# Patient Record
Sex: FEMALE | Race: WHITE | NOT HISPANIC OR LATINO | Employment: FULL TIME | URBAN - METROPOLITAN AREA
[De-identification: names, ages, dates, MRNs, and addresses within clinical notes are randomized per-mention and may not be internally consistent; named-entity substitution may affect disease eponyms.]

---

## 2017-06-23 DIAGNOSIS — Z00.00 ROUTINE GENERAL MEDICAL EXAMINATION AT A HEALTH CARE FACILITY: ICD-10-CM

## 2017-06-23 RX ORDER — CITALOPRAM 20 MG/1
TABLET ORAL
Qty: 30 TABLET | Refills: 9 | OUTPATIENT
Start: 2017-06-23

## 2017-07-05 ENCOUNTER — APPOINTMENT (OUTPATIENT)
Dept: LAB | Facility: HOSPITAL | Age: 42
End: 2017-07-05

## 2017-07-05 ENCOUNTER — OFFICE VISIT (OUTPATIENT)
Dept: FAMILY MEDICINE CLINIC | Facility: CLINIC | Age: 42
End: 2017-07-05

## 2017-07-05 VITALS
HEIGHT: 64 IN | BODY MASS INDEX: 26.29 KG/M2 | DIASTOLIC BLOOD PRESSURE: 72 MMHG | SYSTOLIC BLOOD PRESSURE: 108 MMHG | TEMPERATURE: 97.9 F | HEART RATE: 99 BPM | OXYGEN SATURATION: 100 % | RESPIRATION RATE: 14 BRPM | WEIGHT: 154 LBS

## 2017-07-05 DIAGNOSIS — Z00.00 ROUTINE GENERAL MEDICAL EXAMINATION AT A HEALTH CARE FACILITY: ICD-10-CM

## 2017-07-05 DIAGNOSIS — Z00.00 ROUTINE GENERAL MEDICAL EXAMINATION AT A HEALTH CARE FACILITY: Primary | ICD-10-CM

## 2017-07-05 LAB
ALBUMIN SERPL-MCNC: 4.5 G/DL (ref 3.2–4.8)
ALBUMIN/GLOB SERPL: 1.9 G/DL (ref 1.5–2.5)
ALP SERPL-CCNC: 50 U/L (ref 25–100)
ALT SERPL W P-5'-P-CCNC: 11 U/L (ref 7–40)
ANION GAP SERPL CALCULATED.3IONS-SCNC: 15 MMOL/L (ref 3–11)
ARTICHOKE IGE QN: 87 MG/DL (ref 0–130)
AST SERPL-CCNC: 18 U/L (ref 0–33)
BASOPHILS # BLD AUTO: 0.01 10*3/MM3 (ref 0–0.2)
BASOPHILS NFR BLD AUTO: 0.1 % (ref 0–1)
BILIRUB BLD-MCNC: NEGATIVE MG/DL
BILIRUB SERPL-MCNC: 0.7 MG/DL (ref 0.3–1.2)
BUN BLD-MCNC: 15 MG/DL (ref 9–23)
BUN/CREAT SERPL: 18.8 (ref 7–25)
CALCIUM SPEC-SCNC: 10 MG/DL (ref 8.7–10.4)
CHLORIDE SERPL-SCNC: 98 MMOL/L (ref 99–109)
CHOLEST SERPL-MCNC: 207 MG/DL (ref 0–200)
CLARITY, POC: CLEAR
CO2 SERPL-SCNC: 30 MMOL/L (ref 20–31)
COLOR UR: YELLOW
CREAT BLD-MCNC: 0.8 MG/DL (ref 0.6–1.3)
DEPRECATED RDW RBC AUTO: 44.7 FL (ref 37–54)
EOSINOPHIL # BLD AUTO: 0.2 10*3/MM3 (ref 0–0.3)
EOSINOPHIL NFR BLD AUTO: 2.8 % (ref 0–3)
ERYTHROCYTE [DISTWIDTH] IN BLOOD BY AUTOMATED COUNT: 13.2 % (ref 11.3–14.5)
GFR SERPL CREATININE-BSD FRML MDRD: 80 ML/MIN/1.73
GLOBULIN UR ELPH-MCNC: 2.4 GM/DL
GLUCOSE BLD-MCNC: 83 MG/DL (ref 70–100)
GLUCOSE UR STRIP-MCNC: NEGATIVE MG/DL
HCT VFR BLD AUTO: 42.1 % (ref 34.5–44)
HDLC SERPL-MCNC: 107 MG/DL (ref 40–60)
HGB BLD-MCNC: 13.7 G/DL (ref 11.5–15.5)
IMM GRANULOCYTES # BLD: 0.01 10*3/MM3 (ref 0–0.03)
IMM GRANULOCYTES NFR BLD: 0.1 % (ref 0–0.6)
KETONES UR QL: NEGATIVE
LEUKOCYTE EST, POC: NEGATIVE
LYMPHOCYTES # BLD AUTO: 1.39 10*3/MM3 (ref 0.6–4.8)
LYMPHOCYTES NFR BLD AUTO: 19.3 % (ref 24–44)
MCH RBC QN AUTO: 29.9 PG (ref 27–31)
MCHC RBC AUTO-ENTMCNC: 32.5 G/DL (ref 32–36)
MCV RBC AUTO: 91.9 FL (ref 80–99)
MONOCYTES # BLD AUTO: 0.55 10*3/MM3 (ref 0–1)
MONOCYTES NFR BLD AUTO: 7.6 % (ref 0–12)
NEUTROPHILS # BLD AUTO: 5.05 10*3/MM3 (ref 1.5–8.3)
NEUTROPHILS NFR BLD AUTO: 70.1 % (ref 41–71)
NITRITE UR-MCNC: NEGATIVE MG/ML
PH UR: 7 [PH] (ref 5–8)
PLATELET # BLD AUTO: 311 10*3/MM3 (ref 150–450)
PMV BLD AUTO: 10.5 FL (ref 6–12)
POTASSIUM BLD-SCNC: 4.6 MMOL/L (ref 3.5–5.5)
PROT SERPL-MCNC: 6.9 G/DL (ref 5.7–8.2)
PROT UR STRIP-MCNC: NEGATIVE MG/DL
RBC # BLD AUTO: 4.58 10*6/MM3 (ref 3.89–5.14)
RBC # UR STRIP: ABNORMAL /UL
SODIUM BLD-SCNC: 143 MMOL/L (ref 132–146)
SP GR UR: 1 (ref 1–1.03)
TRIGL SERPL-MCNC: 37 MG/DL (ref 0–150)
TSH SERPL DL<=0.05 MIU/L-ACNC: 3.12 MIU/ML (ref 0.35–5.35)
UROBILINOGEN UR QL: NORMAL
WBC NRBC COR # BLD: 7.21 10*3/MM3 (ref 3.5–10.8)

## 2017-07-05 PROCEDURE — 36415 COLL VENOUS BLD VENIPUNCTURE: CPT | Performed by: PHYSICIAN ASSISTANT

## 2017-07-05 PROCEDURE — 99395 PREV VISIT EST AGE 18-39: CPT | Performed by: PHYSICIAN ASSISTANT

## 2017-07-05 PROCEDURE — 80061 LIPID PANEL: CPT | Performed by: PHYSICIAN ASSISTANT

## 2017-07-05 PROCEDURE — 85025 COMPLETE CBC W/AUTO DIFF WBC: CPT | Performed by: PHYSICIAN ASSISTANT

## 2017-07-05 PROCEDURE — 81003 URINALYSIS AUTO W/O SCOPE: CPT | Performed by: PHYSICIAN ASSISTANT

## 2017-07-05 PROCEDURE — 80053 COMPREHEN METABOLIC PANEL: CPT | Performed by: PHYSICIAN ASSISTANT

## 2017-07-05 PROCEDURE — 84443 ASSAY THYROID STIM HORMONE: CPT | Performed by: PHYSICIAN ASSISTANT

## 2017-07-05 PROCEDURE — 93000 ELECTROCARDIOGRAM COMPLETE: CPT | Performed by: PHYSICIAN ASSISTANT

## 2017-07-05 RX ORDER — CITALOPRAM 20 MG/1
20 TABLET ORAL DAILY
Qty: 30 TABLET | Refills: 11 | Status: SHIPPED | OUTPATIENT
Start: 2017-07-05 | End: 2017-11-02 | Stop reason: SDUPTHER

## 2017-07-05 NOTE — PROGRESS NOTES
Subjective   Valery Soriano is a 41 y.o. female    History of Present Illness  He's a pleasant 41-year-old white female comes in for full preventive medical examination, denies any problems or complaints doing well no shortness breath no chest pain      The following portions of the patient's history were reviewed and updated as appropriate: allergies, current medications, past social history and problem list    Review of Systems   Constitutional: Negative.    HENT: Negative.    Eyes: Negative.    Respiratory: Negative.    Cardiovascular: Negative.    Gastrointestinal: Negative.    Endocrine: Negative.    Genitourinary: Negative.    Musculoskeletal: Negative.    Skin: Negative.    Allergic/Immunologic: Negative.    Neurological: Negative.    Hematological: Negative.    Psychiatric/Behavioral: Negative.    All other systems reviewed and are negative.      Objective     Vitals:    07/05/17 0900   BP: 108/72   Pulse: 99   Resp: 14   Temp: 97.9 °F (36.6 °C)   SpO2: 100%       Physical Exam   Constitutional: She is oriented to person, place, and time. She appears well-developed and well-nourished.   HENT:   Head: Normocephalic and atraumatic.   Right Ear: External ear normal.   Left Ear: External ear normal.   Nose: Nose normal.   Mouth/Throat: Oropharynx is clear and moist.   Eyes: Conjunctivae and EOM are normal. Pupils are equal, round, and reactive to light.   Neck: Normal range of motion. Neck supple. No JVD present. Carotid bruit is not present. No thyromegaly present.   Cardiovascular: Normal rate, regular rhythm, normal heart sounds and intact distal pulses.    No murmur heard.  Pulmonary/Chest: Effort normal and breath sounds normal.   Abdominal: Soft. Bowel sounds are normal. She exhibits no mass. There is no tenderness.   Musculoskeletal: Normal range of motion. She exhibits no edema.   Lymphadenopathy:     She has no cervical adenopathy.   Neurological: She is alert and oriented to person, place, and time. She  has normal reflexes. No cranial nerve deficit.   Skin: Skin is warm and dry.   Psychiatric: She has a normal mood and affect.   Nursing note and vitals reviewed.    ECG 12 Lead  Date/Time: 7/5/2017 9:26 AM  Performed by: JUSTICE DE LA GARZA  Authorized by: JUSTICE DE LA GARZA   Comparison: not compared with previous ECG   Rhythm: sinus rhythm  Rate: normal  ST Segments: ST segments normal  T Waves: T waves normal  QRS axis: normal  Clinical impression: normal ECG  Comments: nsr            Assessment/Plan     Diagnoses and all orders for this visit:    Routine general medical examination at a health care facility  -     POCT urinalysis dipstick, automated  -     ECG 12 Lead  -     Lipid Panel  -     TSH  -     CBC & Differential  -     Comprehensive Metabolic Panel    Preventive medicine discussed at length, healthy diet, exercise, healthy living TIPS discussed.  Diet low carb low calorie diet along with exercise 3-5 times per week for 30 minutes discussed at length.

## 2017-10-22 ENCOUNTER — OFFICE VISIT (OUTPATIENT)
Dept: RETAIL CLINIC | Facility: CLINIC | Age: 42
End: 2017-10-22

## 2017-10-22 DIAGNOSIS — Z23 FLU VACCINE NEED: Primary | ICD-10-CM

## 2017-10-22 NOTE — PROGRESS NOTES
Patient presents to clinic for seasonal influenza vaccination.  Denies allergies to eggs, latex, mercury or other flu vaccine components.  Denies previous vaccine reaction, current illness or fever.      Consent discussed and signed. VIS given. Vaccination administered. Patient tolerated well. See scanned documents.

## 2017-10-27 ENCOUNTER — TRANSCRIBE ORDERS (OUTPATIENT)
Dept: FAMILY MEDICINE CLINIC | Facility: CLINIC | Age: 42
End: 2017-10-27

## 2017-10-27 DIAGNOSIS — Z12.31 VISIT FOR SCREENING MAMMOGRAM: Primary | ICD-10-CM

## 2017-11-02 DIAGNOSIS — Z00.00 ROUTINE GENERAL MEDICAL EXAMINATION AT A HEALTH CARE FACILITY: ICD-10-CM

## 2017-11-02 RX ORDER — CITALOPRAM 20 MG/1
20 TABLET ORAL DAILY
Qty: 90 TABLET | Refills: 2 | Status: SHIPPED | OUTPATIENT
Start: 2017-11-02 | End: 2018-07-13 | Stop reason: SDUPTHER

## 2017-11-27 ENCOUNTER — APPOINTMENT (OUTPATIENT)
Dept: MAMMOGRAPHY | Facility: HOSPITAL | Age: 42
End: 2017-11-27

## 2017-12-01 ENCOUNTER — APPOINTMENT (OUTPATIENT)
Dept: MAMMOGRAPHY | Facility: HOSPITAL | Age: 42
End: 2017-12-01

## 2017-12-05 ENCOUNTER — HOSPITAL ENCOUNTER (OUTPATIENT)
Dept: MAMMOGRAPHY | Facility: HOSPITAL | Age: 42
Discharge: HOME | End: 2017-12-05
Admitting: PHYSICIAN ASSISTANT

## 2017-12-05 ENCOUNTER — TRANSCRIBE ORDERS (OUTPATIENT)
Dept: FAMILY MEDICINE CLINIC | Facility: CLINIC | Age: 42
End: 2017-12-05

## 2017-12-05 DIAGNOSIS — Z12.39 SCREENING BREAST EXAMINATION: ICD-10-CM

## 2017-12-05 DIAGNOSIS — Z12.39 SCREENING BREAST EXAMINATION: Primary | ICD-10-CM

## 2017-12-05 PROCEDURE — 77063 BREAST TOMOSYNTHESIS BI: CPT

## 2017-12-05 PROCEDURE — 77063 BREAST TOMOSYNTHESIS BI: CPT | Performed by: RADIOLOGY

## 2017-12-05 PROCEDURE — 77067 SCR MAMMO BI INCL CAD: CPT | Performed by: RADIOLOGY

## 2017-12-05 PROCEDURE — G0202 SCR MAMMO BI INCL CAD: HCPCS

## 2017-12-19 ENCOUNTER — OFFICE VISIT (OUTPATIENT)
Dept: FAMILY MEDICINE CLINIC | Facility: CLINIC | Age: 42
End: 2017-12-19

## 2017-12-19 VITALS
BODY MASS INDEX: 28.75 KG/M2 | SYSTOLIC BLOOD PRESSURE: 104 MMHG | OXYGEN SATURATION: 98 % | HEIGHT: 64 IN | DIASTOLIC BLOOD PRESSURE: 76 MMHG | WEIGHT: 168.4 LBS | HEART RATE: 92 BPM | RESPIRATION RATE: 14 BRPM

## 2017-12-19 DIAGNOSIS — IMO0001 CLASS 3 OBESITY DUE TO EXCESS CALORIES WITHOUT SERIOUS COMORBIDITY WITH BODY MASS INDEX (BMI) OF 60.0 TO 69.9 IN ADULT: Primary | ICD-10-CM

## 2017-12-19 PROCEDURE — 99213 OFFICE O/P EST LOW 20 MIN: CPT | Performed by: PHYSICIAN ASSISTANT

## 2017-12-19 NOTE — PROGRESS NOTES
Subjective   Valery Soriano is a 42 y.o. female    History of Present Illness  Patient 42-year-old white female comes in complaining of weight gain obesity she's having trouble losing weight she been trying to follow a low-fat low calorie diet without much success.  She has feels tired has been doing a low carb low calorie diet.      The following portions of the patient's history were reviewed and updated as appropriate: allergies, current medications, past social history and problem list    Review of Systems   Constitutional: Negative for fatigue and unexpected weight change.   Respiratory: Negative for cough, chest tightness and shortness of breath.    Cardiovascular: Negative for chest pain, palpitations and leg swelling.   Gastrointestinal: Negative for nausea.   Skin: Negative for color change and rash.   Neurological: Negative for dizziness, syncope, weakness and headaches.       Objective     Vitals:    12/19/17 1029   BP: 104/76   Pulse: 92   Resp: 14   SpO2: 98%       Physical Exam   Constitutional: She appears well-developed and well-nourished.   Neck: No JVD present.   Cardiovascular: Normal rate, regular rhythm, normal heart sounds, intact distal pulses and normal pulses.    No murmur heard.  Pulmonary/Chest: Effort normal and breath sounds normal. No respiratory distress.   Abdominal: Soft. Bowel sounds are normal. There is no hepatosplenomegaly. There is no tenderness.   Musculoskeletal: She exhibits no edema.   Skin: Skin is warm and dry.   Nursing note and vitals reviewed.      Assessment/Plan     Diagnoses and all orders for this visit:    Class 3 obesity due to excess calories without serious comorbidity with body mass index (BMI) of 60.0 to 69.9 in adult      #1 phentermine 37.5 mg 1 by mouth everyday dispense 30×3 refills    Low-fat low calorie diet,Discussed ways lose weight such as exercise 3-5 times per week for 30 minutes

## 2018-01-04 ENCOUNTER — OFFICE VISIT (OUTPATIENT)
Dept: RETAIL CLINIC | Facility: CLINIC | Age: 43
End: 2018-01-04

## 2018-01-04 VITALS
RESPIRATION RATE: 16 BRPM | OXYGEN SATURATION: 99 % | HEIGHT: 65 IN | WEIGHT: 164.8 LBS | BODY MASS INDEX: 27.46 KG/M2 | HEART RATE: 89 BPM | TEMPERATURE: 98.5 F

## 2018-01-04 DIAGNOSIS — B02.9 HERPES ZOSTER WITHOUT COMPLICATION: Primary | ICD-10-CM

## 2018-01-04 PROCEDURE — 99213 OFFICE O/P EST LOW 20 MIN: CPT | Performed by: NURSE PRACTITIONER

## 2018-01-04 RX ORDER — VALACYCLOVIR HYDROCHLORIDE 1 G/1
1000 TABLET, FILM COATED ORAL 3 TIMES DAILY
Qty: 21 TABLET | Refills: 0 | Status: SHIPPED | OUTPATIENT
Start: 2018-01-04 | End: 2018-01-11

## 2018-01-04 NOTE — PROGRESS NOTES
"Subjective   Valery Soriano is a 42 y.o. female.     Rash   This is a new problem. Episode onset: 4 days, began with scalp tingling, then pain on touch and then rash. The problem has been rapidly worsening since onset. Location: left face and scalp. The rash is characterized by blistering, burning, pain and redness. Associated with: chicken pox in childhood. Pertinent negatives include no anorexia, eye pain, fatigue, fever or shortness of breath. Past treatments include anti-itch cream. The treatment provided no relief.        The following portions of the patient's history were reviewed and updated as appropriate: allergies, current medications, past medical history, past social history, past surgical history and problem list.    Review of Systems   Constitutional: Negative.  Negative for fatigue and fever.   Eyes: Negative.  Negative for photophobia, pain, discharge, redness and itching.   Respiratory: Negative.  Negative for shortness of breath.    Cardiovascular: Negative.    Gastrointestinal: Negative.  Negative for anorexia.   Skin: Positive for rash (painful, blistering, red rash on left face, worsening, spreading, unresponsive to otc medications).        Pulse 89  Temp 98.5 °F (36.9 °C) (Temporal Artery )   Resp 16  Ht 165.1 cm (65\")  Wt 74.8 kg (164 lb 12.8 oz)  SpO2 99%  BMI 27.42 kg/m2     Objective   Physical Exam   Constitutional: She is oriented to person, place, and time. She appears well-developed and well-nourished. No distress.   Eyes: Conjunctivae and EOM are normal. Pupils are equal, round, and reactive to light. Right eye exhibits no discharge. Left eye exhibits no discharge.   Lymphadenopathy:        Head (right side): No tonsillar, no preauricular, no posterior auricular and no occipital adenopathy present.        Head (left side): No tonsillar, no preauricular, no posterior auricular and no occipital adenopathy present.     She has no cervical adenopathy.   Neurological: She is alert and " oriented to person, place, and time.   Skin: Skin is warm and dry. Rash (vesicular, encursted red rash on left forhead, upper left cheek and left scalp, painful on palpation, no secondary infection noted, rash approaches, but does not cross the midline of the face ) noted.        Psychiatric: She has a normal mood and affect. Her behavior is normal. Thought content normal.   Vitals reviewed.      Assessment/Plan   Valery was seen today for rash.    Diagnoses and all orders for this visit:    Herpes zoster without complication  -     valACYclovir (VALTREX) 1000 MG tablet; Take 1 tablet by mouth 3 (Three) Times a Day for 7 days.

## 2018-01-04 NOTE — PATIENT INSTRUCTIONS
Shingles  Shingles, which is also known as herpes zoster, is an infection that causes a painful skin rash and fluid-filled blisters. Shingles is not related to genital herpes, which is a sexually transmitted infection.   Shingles only develops in people who:  · Have had chickenpox.  · Have received the chickenpox vaccine. (This is rare.)  CAUSES  Shingles is caused by varicella-zoster virus (VZV). This is the same virus that causes chickenpox. After exposure to VZV, the virus stays in the body in an inactive (dormant) state. Shingles develops if the virus reactivates. This can happen many years after the initial exposure to VZV. It is not known what causes this virus to reactivate.  RISK FACTORS  People who have had chickenpox or received the chickenpox vaccine are at risk for shingles. Infection is more common in people who:  · Are older than age 50.  · Have a weakened defense (immune) system, such as those with HIV, AIDS, or cancer.  · Are taking medicines that weaken the immune system, such as transplant medicines.  · Are under great stress.  SYMPTOMS  Early symptoms of this condition include itching, tingling, and pain in an area on your skin. Pain may be described as burning, stabbing, or throbbing.  A few days or weeks after symptoms start, a painful red rash appears, usually on one side of the body in a bandlike or beltlike pattern. The rash eventually turns into fluid-filled blisters that break open, scab over, and dry up in about 2-3 weeks.  At any time during the infection, you may also develop:  · A fever.  · Chills.  · A headache.  · An upset stomach.  DIAGNOSIS  This condition is diagnosed with a skin exam. Sometimes, skin or fluid samples are taken from the blisters before a diagnosis is made. These samples are examined under a microscope or sent to a lab for testing.  TREATMENT  There is no specific cure for this condition. Your health care provider will probably prescribe medicines to help you manage  pain, recover more quickly, and avoid long-term problems. Medicines may include:  · Antiviral drugs.  · Anti-inflammatory drugs.  · Pain medicines.  If the area involved is on your face, you may be referred to a specialist, such as an eye doctor (ophthalmologist) or an ear, nose, and throat (ENT) doctor to help you avoid eye problems, chronic pain, or disability.  HOME CARE INSTRUCTIONS  Medicines  · Take medicines only as directed by your health care provider.  · Apply an anti-itch or numbing cream to the affected area as directed by your health care provider.  Blister and Rash Care  · Take a cool bath or apply cool compresses to the area of the rash or blisters as directed by your health care provider. This may help with pain and itching.  · Keep your rash covered with a loose bandage (dressing). Wear loose-fitting clothing to help ease the pain of material rubbing against the rash.  · Keep your rash and blisters clean with mild soap and cool water or as directed by your health care provider.  · Check your rash every day for signs of infection. These include redness, swelling, and pain that lasts or increases.  · Do not pick your blisters.  · Do not scratch your rash.  General Instructions  · Rest as directed by your health care provider.  · Keep all follow-up visits as directed by your health care provider. This is important.  · Until your blisters scab over, your infection can cause chickenpox in people who have never had it or been vaccinated against it. To prevent this from happening, avoid contact with other people, especially:    Babies.    Pregnant women.    Children who have eczema.    Elderly people who have transplants.    People who have chronic illnesses, such as leukemia or AIDS.  SEEK MEDICAL CARE IF:  · Your pain is not relieved with prescribed medicines.  · Your pain does not get better after the rash heals.  · Your rash looks infected. Signs of infection include redness, swelling, and pain that  lasts or increases.  SEEK IMMEDIATE MEDICAL CARE IF:  · The rash is on your face or nose.  · You have facial pain, pain around your eye area, or loss of feeling on one side of your face.  · You have ear pain or you have ringing in your ear.  · You have loss of taste.  · Your condition gets worse.     This information is not intended to replace advice given to you by your health care provider. Make sure you discuss any questions you have with your health care provider.     Document Released: 12/18/2006 Document Revised: 04/10/2017 Document Reviewed: 10/29/2015  [employer] Interactive Patient Education ©2017 [employer]

## 2018-01-17 ENCOUNTER — HOSPITAL ENCOUNTER (OUTPATIENT)
Dept: MAMMOGRAPHY | Facility: HOSPITAL | Age: 43
End: 2018-01-17

## 2018-01-22 ENCOUNTER — OFFICE VISIT (OUTPATIENT)
Dept: FAMILY MEDICINE CLINIC | Facility: CLINIC | Age: 43
End: 2018-01-22

## 2018-01-22 VITALS
TEMPERATURE: 98.6 F | OXYGEN SATURATION: 98 % | HEART RATE: 100 BPM | HEIGHT: 65 IN | WEIGHT: 162.2 LBS | SYSTOLIC BLOOD PRESSURE: 110 MMHG | RESPIRATION RATE: 14 BRPM | DIASTOLIC BLOOD PRESSURE: 74 MMHG | BODY MASS INDEX: 27.02 KG/M2

## 2018-01-22 DIAGNOSIS — R63.5 WEIGHT GAIN: Primary | ICD-10-CM

## 2018-01-22 PROCEDURE — 99212 OFFICE O/P EST SF 10 MIN: CPT | Performed by: PHYSICIAN ASSISTANT

## 2018-01-22 RX ORDER — PHENTERMINE HYDROCHLORIDE 37.5 MG/1
1 TABLET ORAL DAILY
Refills: 3 | COMMUNITY
Start: 2018-01-17 | End: 2018-07-20 | Stop reason: SDUPTHER

## 2018-01-22 NOTE — PROGRESS NOTES
Subjective   Valery Soriano is a 42 y.o. female    History of Present Illness  Patient is a pleasant 42-year-old white female comes in follow-up of weight gain she states she's lost 4 pounds since her last visit she's taking phentermine daily exercising doing well following a low-calorie low-carb diet.      The following portions of the patient's history were reviewed and updated as appropriate: allergies, current medications, past social history and problem list    Review of Systems   Constitutional: Negative for fatigue and unexpected weight change.   Respiratory: Negative for cough, chest tightness and shortness of breath.    Cardiovascular: Negative for chest pain, palpitations and leg swelling.   Gastrointestinal: Negative for nausea.   Skin: Negative for color change and rash.   Neurological: Negative for dizziness, syncope, weakness and headaches.       Objective     Vitals:    01/22/18 1508   BP: 110/74   Pulse: 100   Resp: 14   Temp: 98.6 °F (37 °C)   SpO2: 98%       Physical Exam   Constitutional: She appears well-developed and well-nourished.   Neck: Neck supple. No JVD present. No thyromegaly present.   Cardiovascular: Normal rate, regular rhythm, normal heart sounds, intact distal pulses and normal pulses.    No murmur heard.  Pulmonary/Chest: Effort normal and breath sounds normal. No respiratory distress.   Abdominal: Soft. Bowel sounds are normal. There is no hepatosplenomegaly. There is no tenderness.   Musculoskeletal: She exhibits no edema.   Lymphadenopathy:     She has no cervical adenopathy.   Neurological: No sensory deficit.   Skin: Skin is warm and dry. She is not diaphoretic.   Nursing note and vitals reviewed.      Assessment/Plan     Diagnoses and all orders for this visit:    Weight gain  -     phentermine (ADIPEX-P) 37.5 MG tablet; Take 1 tablet by mouth Daily.    Low-calorie low-carb diet, patient was told to come to 6 pounds in next month is 2 pounds per week

## 2018-07-11 DIAGNOSIS — R63.5 WEIGHT GAIN: ICD-10-CM

## 2018-07-11 RX ORDER — PHENTERMINE HYDROCHLORIDE 37.5 MG/1
TABLET ORAL
Qty: 30 TABLET | Status: CANCELLED | OUTPATIENT
Start: 2018-07-11

## 2018-07-13 DIAGNOSIS — Z00.00 ROUTINE GENERAL MEDICAL EXAMINATION AT A HEALTH CARE FACILITY: ICD-10-CM

## 2018-07-16 RX ORDER — CITALOPRAM 20 MG/1
20 TABLET ORAL DAILY
Qty: 90 TABLET | Refills: 2 | Status: SHIPPED | OUTPATIENT
Start: 2018-07-16 | End: 2019-04-14 | Stop reason: SDUPTHER

## 2018-07-20 DIAGNOSIS — R63.5 WEIGHT GAIN: ICD-10-CM

## 2018-07-20 RX ORDER — PHENTERMINE HYDROCHLORIDE 37.5 MG/1
37.5 TABLET ORAL DAILY
Qty: 30 TABLET | Refills: 1 | OUTPATIENT
Start: 2018-07-20 | End: 2018-10-17

## 2018-07-20 NOTE — TELEPHONE ENCOUNTER
----- Message from Kiki Lopes sent at 7/20/2018 10:49 AM EDT -----  Contact: [facility]   REFILL REQUEST   phentermine (ADIPEX-P) 37.5 MG tablet    [facility]/pharmacy #[id number] - [place], [place] - [facility] [address] AT NEXT TO [facility] - [phone]  - [phone] (Phone)  [phone] (Fax)

## 2018-10-11 ENCOUNTER — OFFICE VISIT (OUTPATIENT)
Dept: RETAIL CLINIC | Facility: CLINIC | Age: 43
End: 2018-10-11

## 2018-10-11 DIAGNOSIS — Z23 NEED FOR VACCINATION: Primary | ICD-10-CM

## 2018-10-17 ENCOUNTER — OFFICE VISIT (OUTPATIENT)
Dept: FAMILY MEDICINE CLINIC | Facility: CLINIC | Age: 43
End: 2018-10-17

## 2018-10-17 VITALS
HEIGHT: 65 IN | OXYGEN SATURATION: 98 % | TEMPERATURE: 98.2 F | DIASTOLIC BLOOD PRESSURE: 80 MMHG | HEART RATE: 87 BPM | SYSTOLIC BLOOD PRESSURE: 121 MMHG | RESPIRATION RATE: 14 BRPM | WEIGHT: 170.6 LBS | BODY MASS INDEX: 28.42 KG/M2

## 2018-10-17 DIAGNOSIS — Z00.00 ROUTINE GENERAL MEDICAL EXAMINATION AT A HEALTH CARE FACILITY: Primary | ICD-10-CM

## 2018-10-17 LAB
BILIRUB BLD-MCNC: NEGATIVE MG/DL
CLARITY, POC: CLEAR
COLOR UR: YELLOW
GLUCOSE UR STRIP-MCNC: NEGATIVE MG/DL
KETONES UR QL: NEGATIVE
LEUKOCYTE EST, POC: NEGATIVE
NITRITE UR-MCNC: NEGATIVE MG/ML
PH UR: 5.5 [PH] (ref 5–8)
PROT UR STRIP-MCNC: NEGATIVE MG/DL
RBC # UR STRIP: ABNORMAL /UL
SP GR UR: 1.02 (ref 1–1.03)
UROBILINOGEN UR QL: NORMAL

## 2018-10-17 PROCEDURE — 99396 PREV VISIT EST AGE 40-64: CPT | Performed by: PHYSICIAN ASSISTANT

## 2018-10-17 PROCEDURE — 93000 ELECTROCARDIOGRAM COMPLETE: CPT | Performed by: PHYSICIAN ASSISTANT

## 2018-10-17 PROCEDURE — 81003 URINALYSIS AUTO W/O SCOPE: CPT | Performed by: PHYSICIAN ASSISTANT

## 2018-10-17 NOTE — PROGRESS NOTES
Subjective   Valery Soriano is a 43 y.o. female  Annual Exam (Not fasting. UTD on MMG, Pap. Had flu/HepA vaccines.)      History of Present Illness  Patient is a 43-year-old white female comes in for preventive medical examination no new problems or complaints      The following portions of the patient's history were reviewed and updated as appropriate: allergies, current medications, past social history and problem list    Review of Systems   Constitutional: Negative.    HENT: Negative.    Eyes: Negative.    Respiratory: Negative.    Cardiovascular: Negative.    Gastrointestinal: Negative.    Endocrine: Negative.    Genitourinary: Negative.    Musculoskeletal: Negative.    Skin: Negative.    Allergic/Immunologic: Negative.    Neurological: Negative.    Hematological: Negative.    Psychiatric/Behavioral: Negative.    All other systems reviewed and are negative.      Objective     Vitals:    10/17/18 1340   BP: 121/80   Pulse: 87   Resp: 14   Temp: 98.2 °F (36.8 °C)   SpO2: 98%       Physical Exam   Constitutional: She is oriented to person, place, and time. She appears well-developed and well-nourished.   HENT:   Head: Normocephalic and atraumatic.   Right Ear: External ear normal.   Left Ear: External ear normal.   Nose: Nose normal.   Mouth/Throat: Oropharynx is clear and moist.   Eyes: Pupils are equal, round, and reactive to light. Conjunctivae and EOM are normal.   Neck: Normal range of motion. Neck supple. No JVD present. Carotid bruit is not present. No thyromegaly present.   Cardiovascular: Normal rate, regular rhythm, normal heart sounds and intact distal pulses.    No murmur heard.  Pulmonary/Chest: Effort normal and breath sounds normal.   Abdominal: Soft. Bowel sounds are normal. She exhibits no mass. There is no tenderness.   Musculoskeletal: Normal range of motion. She exhibits no edema.   Lymphadenopathy:     She has no cervical adenopathy.   Neurological: She is alert and oriented to person, place, and  time. She has normal reflexes. No cranial nerve deficit.   Skin: Skin is warm and dry.   Psychiatric: She has a normal mood and affect.   Nursing note and vitals reviewed.    ECG 12 Lead  Date/Time: 10/17/2018 4:35 PM  Performed by: JUSTICE DE LA GARZA  Authorized by: JUSTICE DE LA GARZA   Rate: normal  Conduction: conduction normal  ST Segments: ST segments normal  T Waves: T waves normal  QRS axis: normal  Clinical impression: normal ECG            Assessment/Plan     Diagnoses and all orders for this visit:    Routine general medical examination at a health care facility  -     POCT urinalysis dipstick, automated  -     CBC & Differential; Future  -     Comprehensive Metabolic Panel; Future  -     Lipid Panel; Future  -     TSH; Future  -     Vitamin D 25 hydroxy; Future    Any medicine discussed, diet, exercise, healthy living discussed at length discussed importance of losing weight exercise

## 2018-10-22 ENCOUNTER — LAB (OUTPATIENT)
Dept: LAB | Facility: HOSPITAL | Age: 43
End: 2018-10-22

## 2018-10-22 DIAGNOSIS — Z00.00 ROUTINE GENERAL MEDICAL EXAMINATION AT A HEALTH CARE FACILITY: ICD-10-CM

## 2018-10-22 LAB
25(OH)D3 SERPL-MCNC: 18.8 NG/ML
ALBUMIN SERPL-MCNC: 4.76 G/DL (ref 3.2–4.8)
ALBUMIN/GLOB SERPL: 2.7 G/DL (ref 1.5–2.5)
ALP SERPL-CCNC: 50 U/L (ref 25–100)
ALT SERPL W P-5'-P-CCNC: 14 U/L (ref 7–40)
ANION GAP SERPL CALCULATED.3IONS-SCNC: 9 MMOL/L (ref 3–11)
ARTICHOKE IGE QN: 85 MG/DL (ref 0–130)
AST SERPL-CCNC: 17 U/L (ref 0–33)
BASOPHILS # BLD AUTO: 0.01 10*3/MM3 (ref 0–0.2)
BASOPHILS NFR BLD AUTO: 0.1 % (ref 0–1)
BILIRUB SERPL-MCNC: 0.8 MG/DL (ref 0.3–1.2)
BUN BLD-MCNC: 14 MG/DL (ref 9–23)
BUN/CREAT SERPL: 15.7 (ref 7–25)
CALCIUM SPEC-SCNC: 9.7 MG/DL (ref 8.7–10.4)
CHLORIDE SERPL-SCNC: 103 MMOL/L (ref 99–109)
CHOLEST SERPL-MCNC: 185 MG/DL (ref 0–200)
CO2 SERPL-SCNC: 28 MMOL/L (ref 20–31)
CREAT BLD-MCNC: 0.89 MG/DL (ref 0.6–1.3)
DEPRECATED RDW RBC AUTO: 43.2 FL (ref 37–54)
EOSINOPHIL # BLD AUTO: 0.3 10*3/MM3 (ref 0–0.3)
EOSINOPHIL NFR BLD AUTO: 4.2 % (ref 0–3)
ERYTHROCYTE [DISTWIDTH] IN BLOOD BY AUTOMATED COUNT: 13.3 % (ref 11.3–14.5)
GFR SERPL CREATININE-BSD FRML MDRD: 70 ML/MIN/1.73
GLOBULIN UR ELPH-MCNC: 1.7 GM/DL
GLUCOSE BLD-MCNC: 95 MG/DL (ref 70–100)
HCT VFR BLD AUTO: 45 % (ref 34.5–44)
HDLC SERPL-MCNC: 92 MG/DL (ref 40–60)
HGB BLD-MCNC: 14.6 G/DL (ref 11.5–15.5)
IMM GRANULOCYTES # BLD: 0.02 10*3/MM3 (ref 0–0.03)
IMM GRANULOCYTES NFR BLD: 0.3 % (ref 0–0.6)
LYMPHOCYTES # BLD AUTO: 1.49 10*3/MM3 (ref 0.6–4.8)
LYMPHOCYTES NFR BLD AUTO: 20.6 % (ref 24–44)
MCH RBC QN AUTO: 29.3 PG (ref 27–31)
MCHC RBC AUTO-ENTMCNC: 32.4 G/DL (ref 32–36)
MCV RBC AUTO: 90.4 FL (ref 80–99)
MONOCYTES # BLD AUTO: 0.52 10*3/MM3 (ref 0–1)
MONOCYTES NFR BLD AUTO: 7.2 % (ref 0–12)
NEUTROPHILS # BLD AUTO: 4.88 10*3/MM3 (ref 1.5–8.3)
NEUTROPHILS NFR BLD AUTO: 67.6 % (ref 41–71)
PLATELET # BLD AUTO: 331 10*3/MM3 (ref 150–450)
PMV BLD AUTO: 9.8 FL (ref 6–12)
POTASSIUM BLD-SCNC: 4.7 MMOL/L (ref 3.5–5.5)
PROT SERPL-MCNC: 6.5 G/DL (ref 5.7–8.2)
RBC # BLD AUTO: 4.98 10*6/MM3 (ref 3.89–5.14)
SODIUM BLD-SCNC: 140 MMOL/L (ref 132–146)
TRIGL SERPL-MCNC: 59 MG/DL (ref 0–150)
TSH SERPL DL<=0.05 MIU/L-ACNC: 2.42 MIU/ML (ref 0.35–5.35)
WBC NRBC COR # BLD: 7.22 10*3/MM3 (ref 3.5–10.8)

## 2018-10-22 PROCEDURE — 80053 COMPREHEN METABOLIC PANEL: CPT

## 2018-10-22 PROCEDURE — 82306 VITAMIN D 25 HYDROXY: CPT

## 2018-10-22 PROCEDURE — 80061 LIPID PANEL: CPT

## 2018-10-22 PROCEDURE — 84443 ASSAY THYROID STIM HORMONE: CPT

## 2018-10-22 PROCEDURE — 85025 COMPLETE CBC W/AUTO DIFF WBC: CPT

## 2018-10-22 PROCEDURE — 36415 COLL VENOUS BLD VENIPUNCTURE: CPT

## 2018-12-12 ENCOUNTER — TRANSCRIBE ORDERS (OUTPATIENT)
Dept: MAMMOGRAPHY | Facility: HOSPITAL | Age: 43
End: 2018-12-12

## 2018-12-12 ENCOUNTER — HOSPITAL ENCOUNTER (OUTPATIENT)
Dept: MAMMOGRAPHY | Facility: HOSPITAL | Age: 43
Discharge: HOME | End: 2018-12-12
Admitting: FAMILY MEDICINE

## 2018-12-12 DIAGNOSIS — Z12.31 VISIT FOR SCREENING MAMMOGRAM: ICD-10-CM

## 2018-12-12 DIAGNOSIS — Z12.31 VISIT FOR SCREENING MAMMOGRAM: Primary | ICD-10-CM

## 2018-12-12 PROCEDURE — 77063 BREAST TOMOSYNTHESIS BI: CPT | Performed by: RADIOLOGY

## 2018-12-12 PROCEDURE — 77067 SCR MAMMO BI INCL CAD: CPT

## 2018-12-12 PROCEDURE — 77063 BREAST TOMOSYNTHESIS BI: CPT

## 2018-12-12 PROCEDURE — 77067 SCR MAMMO BI INCL CAD: CPT | Performed by: RADIOLOGY

## 2019-03-27 NOTE — TELEPHONE ENCOUNTER
Regarding: Prescription Question  Contact: [phone]  ----- Message from [employer], Generic sent at 3/22/2019 12:06 PM EDT -----    I would like to see if I could get another RX for the diet medicine?

## 2019-03-28 ENCOUNTER — TELEPHONE (OUTPATIENT)
Dept: FAMILY MEDICINE CLINIC | Facility: CLINIC | Age: 44
End: 2019-03-28

## 2019-03-28 RX ORDER — PHENTERMINE HYDROCHLORIDE 37.5 MG/1
37.5 TABLET ORAL
Qty: 30 TABLET | Refills: 1 | OUTPATIENT
Start: 2019-03-28 | End: 2019-10-25

## 2019-03-28 NOTE — TELEPHONE ENCOUNTER
Tried to call patient but no answer. Message sent to her on [employer] to see where she wants med call in.

## 2019-03-28 NOTE — TELEPHONE ENCOUNTER
----- Message from Ursula Hercules sent at 3/28/2019  2:16 PM EDT -----  Contact[facility] [place]N, [phone]  PT. SEE'S DR. BUZZ ALVES STATED A MED. WAS CALLED IN FOR PHENTERMINE.  NEEDING PT'S. BMI.    CONTACT JOSELYN @ [facility].

## 2019-04-14 DIAGNOSIS — Z00.00 ROUTINE GENERAL MEDICAL EXAMINATION AT A HEALTH CARE FACILITY: ICD-10-CM

## 2019-04-15 RX ORDER — CITALOPRAM 20 MG/1
20 TABLET ORAL DAILY
Qty: 90 TABLET | Refills: 2 | Status: SHIPPED | OUTPATIENT
Start: 2019-04-15 | End: 2019-10-25 | Stop reason: SDUPTHER

## 2019-10-25 ENCOUNTER — OFFICE VISIT (OUTPATIENT)
Dept: FAMILY MEDICINE CLINIC | Facility: CLINIC | Age: 44
End: 2019-10-25

## 2019-10-25 VITALS
RESPIRATION RATE: 16 BRPM | SYSTOLIC BLOOD PRESSURE: 118 MMHG | HEART RATE: 95 BPM | OXYGEN SATURATION: 99 % | WEIGHT: 177 LBS | TEMPERATURE: 98.5 F | HEIGHT: 65 IN | DIASTOLIC BLOOD PRESSURE: 78 MMHG | BODY MASS INDEX: 29.49 KG/M2

## 2019-10-25 DIAGNOSIS — Z00.00 ROUTINE GENERAL MEDICAL EXAMINATION AT A HEALTH CARE FACILITY: ICD-10-CM

## 2019-10-25 LAB
BILIRUB BLD-MCNC: NEGATIVE MG/DL
CLARITY, POC: CLEAR
COLOR UR: YELLOW
GLUCOSE UR STRIP-MCNC: NEGATIVE MG/DL
KETONES UR QL: NEGATIVE
LEUKOCYTE EST, POC: NEGATIVE
NITRITE UR-MCNC: NEGATIVE MG/ML
PH UR: 6 [PH] (ref 5–8)
PROT UR STRIP-MCNC: NEGATIVE MG/DL
RBC # UR STRIP: ABNORMAL /UL
SP GR UR: 1.01 (ref 1–1.03)
UROBILINOGEN UR QL: NORMAL

## 2019-10-25 PROCEDURE — 99396 PREV VISIT EST AGE 40-64: CPT | Performed by: PHYSICIAN ASSISTANT

## 2019-10-25 PROCEDURE — 93000 ELECTROCARDIOGRAM COMPLETE: CPT | Performed by: PHYSICIAN ASSISTANT

## 2019-10-25 PROCEDURE — 81003 URINALYSIS AUTO W/O SCOPE: CPT | Performed by: PHYSICIAN ASSISTANT

## 2019-10-25 RX ORDER — CITALOPRAM 20 MG/1
20 TABLET ORAL DAILY
Qty: 90 TABLET | Refills: 3 | Status: SHIPPED | OUTPATIENT
Start: 2019-10-25 | End: 2020-10-30 | Stop reason: SDUPTHER

## 2019-10-25 NOTE — PROGRESS NOTES
Subjective   Valery Soriano is a 44 y.o. female  Annual Exam (Pt is fasting. UTD on MMG, vaccines. )      History of Present Illness  Patient is a pleasant 44-year-old white female who comes in complaining of preventive medical examination denies any new problems or complaints needs refill of medications no shortness of breath no chest pain no nausea vomiting no shortness of breath no chest pain  The following portions of the patient's history were reviewed and updated as appropriate: allergies, current medications, past social history and problem list    Review of Systems   Constitutional: Negative for appetite change, diaphoresis, fatigue and unexpected weight change.   Eyes: Negative for visual disturbance.   Respiratory: Negative for cough, chest tightness and shortness of breath.    Cardiovascular: Negative for chest pain, palpitations and leg swelling.   Gastrointestinal: Negative for diarrhea, nausea and vomiting.   Endocrine: Negative for polydipsia, polyphagia and polyuria.   Skin: Negative for color change and rash.   Neurological: Negative for dizziness, syncope, weakness, light-headedness, numbness and headaches.       Objective     Vitals:    10/25/19 1343   BP: 118/78   Pulse: 95   Resp: 16   Temp: 98.5 °F (36.9 °C)   SpO2: 99%       Physical Exam   Constitutional: She appears well-developed and well-nourished.   Neck: Neck supple. No JVD present. No thyromegaly present.   Cardiovascular: Normal rate, regular rhythm, normal heart sounds, intact distal pulses and normal pulses.   No murmur heard.  Pulmonary/Chest: Effort normal and breath sounds normal. No respiratory distress.   Abdominal: Soft. Bowel sounds are normal. There is no hepatosplenomegaly. There is no tenderness.   Musculoskeletal: She exhibits no edema.   Lymphadenopathy:     She has no cervical adenopathy.   Neurological: No sensory deficit.   Skin: Skin is warm and dry. She is not diaphoretic.   Nursing note and vitals reviewed.      ECG 12  Lead  Date/Time: 10/25/2019 2:20 PM  Performed by: Giovany Sampson PA  Authorized by: Giovany Sampson PA   Comparison: not compared with previous ECG   Rhythm: sinus rhythm  Rate: normal  ST Segments: ST segments normal  T Waves: T waves normal  QRS axis: normal    Clinical impression: normal ECG          Assessment/Plan     Diagnoses and all orders for this visit:    Routine general medical examination at a health care facility  -     citalopram (CeleXA) 20 MG tablet; Take 1 tablet by mouth Daily.  -     POCT urinalysis dipstick, automated  -     Lipid Panel; Future  -     Comprehensive Metabolic Panel; Future  -     CBC & Differential; Future  -     TSH; Future  -     Vitamin D 25 hydroxy; Future    Preventive medicine discussed, diet, exercise, healthy living discussed at length preventive medicine discussed discussed ways to improve overall health

## 2019-10-25 NOTE — PROGRESS NOTES
I have reviewed the notes, assessments, and/or procedures performed by Toni Sampson PA-C, I concur with his documentation of Valery Soriano.

## 2019-10-29 ENCOUNTER — LAB (OUTPATIENT)
Dept: LAB | Facility: HOSPITAL | Age: 44
End: 2019-10-29

## 2019-10-29 DIAGNOSIS — Z00.00 ROUTINE GENERAL MEDICAL EXAMINATION AT A HEALTH CARE FACILITY: ICD-10-CM

## 2019-10-29 LAB
25(OH)D3 SERPL-MCNC: 29 NG/ML (ref 30–100)
ALBUMIN SERPL-MCNC: 4.7 G/DL (ref 3.5–5.2)
ALBUMIN/GLOB SERPL: 2.1 G/DL
ALP SERPL-CCNC: 59 U/L (ref 39–117)
ALT SERPL W P-5'-P-CCNC: 12 U/L (ref 1–33)
ANION GAP SERPL CALCULATED.3IONS-SCNC: 13.6 MMOL/L (ref 5–15)
AST SERPL-CCNC: 16 U/L (ref 1–32)
BASOPHILS # BLD AUTO: 0.03 10*3/MM3 (ref 0–0.2)
BASOPHILS NFR BLD AUTO: 0.4 % (ref 0–1.5)
BILIRUB SERPL-MCNC: 0.7 MG/DL (ref 0.2–1.2)
BUN BLD-MCNC: 8 MG/DL (ref 6–20)
BUN/CREAT SERPL: 8.9 (ref 7–25)
CALCIUM SPEC-SCNC: 9.5 MG/DL (ref 8.6–10.5)
CHLORIDE SERPL-SCNC: 103 MMOL/L (ref 98–107)
CHOLEST SERPL-MCNC: 171 MG/DL (ref 0–200)
CO2 SERPL-SCNC: 27.4 MMOL/L (ref 22–29)
CREAT BLD-MCNC: 0.9 MG/DL (ref 0.57–1)
DEPRECATED RDW RBC AUTO: 42 FL (ref 37–54)
EOSINOPHIL # BLD AUTO: 0.44 10*3/MM3 (ref 0–0.4)
EOSINOPHIL NFR BLD AUTO: 5.6 % (ref 0.3–6.2)
ERYTHROCYTE [DISTWIDTH] IN BLOOD BY AUTOMATED COUNT: 12.9 % (ref 12.3–15.4)
GFR SERPL CREATININE-BSD FRML MDRD: 69 ML/MIN/1.73
GLOBULIN UR ELPH-MCNC: 2.2 GM/DL
GLUCOSE BLD-MCNC: 91 MG/DL (ref 65–99)
HCT VFR BLD AUTO: 41.5 % (ref 34–46.6)
HDLC SERPL-MCNC: 80 MG/DL (ref 40–60)
HGB BLD-MCNC: 13.8 G/DL (ref 12–15.9)
IMM GRANULOCYTES # BLD AUTO: 0.02 10*3/MM3 (ref 0–0.05)
IMM GRANULOCYTES NFR BLD AUTO: 0.3 % (ref 0–0.5)
LDLC SERPL CALC-MCNC: 78 MG/DL (ref 0–100)
LDLC/HDLC SERPL: 0.98 {RATIO}
LYMPHOCYTES # BLD AUTO: 1.28 10*3/MM3 (ref 0.7–3.1)
LYMPHOCYTES NFR BLD AUTO: 16.2 % (ref 19.6–45.3)
MCH RBC QN AUTO: 29.7 PG (ref 26.6–33)
MCHC RBC AUTO-ENTMCNC: 33.3 G/DL (ref 31.5–35.7)
MCV RBC AUTO: 89.2 FL (ref 79–97)
MONOCYTES # BLD AUTO: 0.66 10*3/MM3 (ref 0.1–0.9)
MONOCYTES NFR BLD AUTO: 8.4 % (ref 5–12)
NEUTROPHILS # BLD AUTO: 5.46 10*3/MM3 (ref 1.7–7)
NEUTROPHILS NFR BLD AUTO: 69.1 % (ref 42.7–76)
NRBC BLD AUTO-RTO: 0 /100 WBC (ref 0–0.2)
PLATELET # BLD AUTO: 389 10*3/MM3 (ref 140–450)
PMV BLD AUTO: 11.8 FL (ref 6–12)
POTASSIUM BLD-SCNC: 3.9 MMOL/L (ref 3.5–5.2)
PROT SERPL-MCNC: 6.9 G/DL (ref 6–8.5)
RBC # BLD AUTO: 4.65 10*6/MM3 (ref 3.77–5.28)
SODIUM BLD-SCNC: 144 MMOL/L (ref 136–145)
TRIGL SERPL-MCNC: 63 MG/DL (ref 0–150)
TSH SERPL DL<=0.05 MIU/L-ACNC: 4.58 UIU/ML (ref 0.27–4.2)
VLDLC SERPL-MCNC: 12.6 MG/DL
WBC NRBC COR # BLD: 7.89 10*3/MM3 (ref 3.4–10.8)

## 2019-10-29 PROCEDURE — 80053 COMPREHEN METABOLIC PANEL: CPT

## 2019-10-29 PROCEDURE — 84443 ASSAY THYROID STIM HORMONE: CPT

## 2019-10-29 PROCEDURE — 82306 VITAMIN D 25 HYDROXY: CPT

## 2019-10-29 PROCEDURE — 80061 LIPID PANEL: CPT

## 2019-10-29 PROCEDURE — 85025 COMPLETE CBC W/AUTO DIFF WBC: CPT

## 2019-10-29 PROCEDURE — 36415 COLL VENOUS BLD VENIPUNCTURE: CPT

## 2019-11-29 ENCOUNTER — OFFICE VISIT (OUTPATIENT)
Dept: RETAIL CLINIC | Facility: CLINIC | Age: 44
End: 2019-11-29

## 2019-11-29 VITALS
HEIGHT: 65 IN | OXYGEN SATURATION: 99 % | BODY MASS INDEX: 29.52 KG/M2 | DIASTOLIC BLOOD PRESSURE: 72 MMHG | SYSTOLIC BLOOD PRESSURE: 118 MMHG | RESPIRATION RATE: 15 BRPM | TEMPERATURE: 98.7 F | WEIGHT: 177.2 LBS | HEART RATE: 78 BPM

## 2019-11-29 DIAGNOSIS — N30.01 ACUTE CYSTITIS WITH HEMATURIA: Primary | ICD-10-CM

## 2019-11-29 LAB
BILIRUB BLD-MCNC: NEGATIVE MG/DL
CLARITY, POC: ABNORMAL
COLOR UR: ABNORMAL
GLUCOSE UR STRIP-MCNC: ABNORMAL MG/DL
KETONES UR QL: NEGATIVE
LEUKOCYTE EST, POC: ABNORMAL
NITRITE UR-MCNC: POSITIVE MG/ML
PH UR: 7 [PH] (ref 5–8)
PROT UR STRIP-MCNC: NEGATIVE MG/DL
RBC # UR STRIP: ABNORMAL /UL
SP GR UR: 1.01 (ref 1–1.03)
UROBILINOGEN UR QL: NORMAL

## 2019-11-29 PROCEDURE — 99213 OFFICE O/P EST LOW 20 MIN: CPT | Performed by: NURSE PRACTITIONER

## 2019-11-29 PROCEDURE — 81003 URINALYSIS AUTO W/O SCOPE: CPT | Performed by: NURSE PRACTITIONER

## 2019-11-29 RX ORDER — NITROFURANTOIN 25; 75 MG/1; MG/1
100 CAPSULE ORAL 2 TIMES DAILY
Qty: 14 CAPSULE | Refills: 0 | Status: SHIPPED | OUTPATIENT
Start: 2019-11-29 | End: 2019-12-06

## 2019-11-29 NOTE — PROGRESS NOTES
"Subjective   Valery Soriano is a 44 y.o. female.   /72   Pulse 78   Temp 98.7 °F (37.1 °C)   Resp 15   Ht 165.1 cm (65\")   Wt 80.4 kg (177 lb 3.2 oz)   LMP  (LMP Unknown)   SpO2 99%   BMI 29.49 kg/m²     No Known Allergies      Urinary Tract Infection    This is a new problem. Episode onset: yesterday morning. The problem has been gradually worsening. The quality of the pain is described as burning. The pain is moderate. Associated symptoms include flank pain (mild), frequency, hesitancy and urgency. Pertinent negatives include no chills, discharge, hematuria, nausea, possible pregnancy, sweats or vomiting. Her past medical history is significant for recurrent UTIs. There is no history of catheterization, kidney stones, a single kidney, urinary stasis or a urological procedure.        The following portions of the patient's history were reviewed and updated as appropriate: allergies, current medications, past family history, past medical history, past social history, past surgical history and problem list.    Review of Systems   Constitutional: Negative for chills.   HENT: Negative.    Eyes: Negative.    Respiratory: Negative.    Cardiovascular: Negative.    Gastrointestinal: Negative for nausea and vomiting.   Genitourinary: Positive for flank pain (mild), frequency, hesitancy and urgency. Negative for hematuria.       Objective   Physical Exam   Constitutional: She appears well-developed and well-nourished.  Non-toxic appearance. She does not appear ill.   HENT:   Head: Normocephalic and atraumatic.   Right Ear: Tympanic membrane and ear canal normal.   Left Ear: Tympanic membrane and ear canal normal.   Nose: Nose normal. Right sinus exhibits no maxillary sinus tenderness and no frontal sinus tenderness. Left sinus exhibits no maxillary sinus tenderness and no frontal sinus tenderness.   Mouth/Throat: Uvula is midline and oropharynx is clear and moist.   Cardiovascular: Regular rhythm and normal heart " sounds.   Pulmonary/Chest: Effort normal. She has no wheezes. She has no rhonchi. She has no rales.   Abdominal: There is no tenderness. There is no rigidity, no rebound, no guarding, no CVA tenderness, no tenderness at McBurney's point and negative Flores's sign.   Lymphadenopathy:     She has no cervical adenopathy.   Skin: Skin is warm and dry.       Assessment/Plan   Valery was seen today for urinary tract infection.    Diagnoses and all orders for this visit:    Acute cystitis with hematuria  -     POC Urinalysis Dipstick, Automated  -     Urine Culture - Urine, Urine, Clean Catch    Other orders  -     nitrofurantoin, macrocrystal-monohydrate, (MACROBID) 100 MG capsule; Take 1 capsule by mouth 2 (Two) Times a Day for 7 days.      Results for orders placed or performed in visit on 11/29/19   POC Urinalysis Dipstick, Automated   Result Value Ref Range    Color Orange (A) Yellow, Straw, Dark Yellow, Daya    Clarity, UA Turbid (A) Clear    Specific Gravity  1.010 1.005 - 1.030    pH, Urine 7.0 5.0 - 8.0    Leukocytes Small (1+) (A) Negative    Nitrite, UA Positive (A) Negative    Protein, POC Negative Negative mg/dL    Glucose,  mg/dL (A) Negative, 1000 mg/dL (3+) mg/dL    Ketones, UA Negative Negative    Urobilinogen, UA Normal Normal    Bilirubin Negative Negative    Blood, UA Small (A) Negative

## 2019-11-29 NOTE — PATIENT INSTRUCTIONS
Urinary Tract Infection, Adult    A urinary tract infection (UTI) is an infection of any part of the urinary tract. The urinary tract includes the kidneys, ureters, bladder, and urethra. These organs make, store, and get rid of urine in the body.  Your health care provider may use other names to describe the infection. An upper UTI affects the ureters and kidneys (pyelonephritis). A lower UTI affects the bladder (cystitis) and urethra (urethritis).  What are the causes?  Most urinary tract infections are caused by bacteria in your genital area, around the entrance to your urinary tract (urethra). These bacteria grow and cause inflammation of your urinary tract.  What increases the risk?  You are more likely to develop this condition if:  · You have a urinary catheter that stays in place (indwelling).  · You are not able to control when you urinate or have a bowel movement (you have incontinence).  · You are female and you:  ? Use a spermicide or diaphragm for birth control.  ? Have low estrogen levels.  ? Are pregnant.  · You have certain genes that increase your risk (genetics).  · You are sexually active.  · You take antibiotic medicines.  · You have a condition that causes your flow of urine to slow down, such as:  ? An enlarged prostate, if you are male.  ? Blockage in your urethra (stricture).  ? A kidney stone.  ? A nerve condition that affects your bladder control (neurogenic bladder).  ? Not getting enough to drink, or not urinating often.  · You have certain medical conditions, such as:  ? Diabetes.  ? A weak disease-fighting system (immunesystem).  ? Sickle cell disease.  ? Gout.  ? Spinal cord injury.  What are the signs or symptoms?  Symptoms of this condition include:  · Needing to urinate right away (urgently).  · Frequent urination or passing small amounts of urine frequently.  · Pain or burning with urination.  · Blood in the urine.  · Urine that smells bad or unusual.  · Trouble urinating.  · Cloudy  urine.  · Vaginal discharge, if you are female.  · Pain in the abdomen or the lower back.  You may also have:  · Vomiting or a decreased appetite.  · Confusion.  · Irritability or tiredness.  · A fever.  · Diarrhea.  The first symptom in older adults may be confusion. In some cases, they may not have any symptoms until the infection has worsened.  How is this diagnosed?  This condition is diagnosed based on your medical history and a physical exam. You may also have other tests, including:  · Urine tests.  · Blood tests.  · Tests for sexually transmitted infections (STIs).  If you have had more than one UTI, a cystoscopy or imaging studies may be done to determine the cause of the infections.  How is this treated?  Treatment for this condition includes:  · Antibiotic medicine.  · Over-the-counter medicines to treat discomfort.  · Drinking enough water to stay hydrated.  If you have frequent infections or have other conditions such as a kidney stone, you may need to see a health care provider who specializes in the urinary tract (urologist).  In rare cases, urinary tract infections can cause sepsis. Sepsis is a life-threatening condition that occurs when the body responds to an infection. Sepsis is treated in the hospital with IV antibiotics, fluids, and other medicines.  Follow these instructions at home:    Medicines  · Take over-the-counter and prescription medicines only as told by your health care provider.  · If you were prescribed an antibiotic medicine, take it as told by your health care provider. Do not stop using the antibiotic even if you start to feel better.  General instructions  · Make sure you:  ? Empty your bladder often and completely. Do not hold urine for long periods of time.  ? Empty your bladder after sex.  ? Wipe from front to back after a bowel movement if you are female. Use each tissue one time when you wipe.  · Drink enough fluid to keep your urine pale yellow.  · Keep all follow-up  visits as told by your health care provider. This is important.  Contact a health care provider if:  · Your symptoms do not get better after 1-2 days.  · Your symptoms go away and then return.  Get help right away if you have:  · Severe pain in your back or your lower abdomen.  · A fever.  · Nausea or vomiting.  Summary  · A urinary tract infection (UTI) is an infection of any part of the urinary tract, which includes the kidneys, ureters, bladder, and urethra.  · Most urinary tract infections are caused by bacteria in your genital area, around the entrance to your urinary tract (urethra).  · Treatment for this condition often includes antibiotic medicines.  · If you were prescribed an antibiotic medicine, take it as told by your health care provider. Do not stop using the antibiotic even if you start to feel better.  · Keep all follow-up visits as told by your health care provider. This is important.  This information is not intended to replace advice given to you by your health care provider. Make sure you discuss any questions you have with your health care provider.  Document Released: 09/27/2006 Document Revised: 06/27/2019 Document Reviewed: 06/27/2019  [employer] Patient Education © 2019 [employer]

## 2019-12-02 LAB
BACTERIA UR CULT: ABNORMAL
BACTERIA UR CULT: ABNORMAL
OTHER ANTIBIOTIC SUSC ISLT: ABNORMAL

## 2019-12-03 ENCOUNTER — TRANSCRIBE ORDERS (OUTPATIENT)
Dept: MAMMOGRAPHY | Facility: HOSPITAL | Age: 44
End: 2019-12-03

## 2019-12-03 ENCOUNTER — HOSPITAL ENCOUNTER (OUTPATIENT)
Dept: MAMMOGRAPHY | Facility: HOSPITAL | Age: 44
Discharge: HOME | End: 2019-12-03
Admitting: FAMILY MEDICINE

## 2019-12-03 DIAGNOSIS — Z12.31 VISIT FOR SCREENING MAMMOGRAM: ICD-10-CM

## 2019-12-03 DIAGNOSIS — Z12.31 VISIT FOR SCREENING MAMMOGRAM: Primary | ICD-10-CM

## 2019-12-03 PROCEDURE — 77063 BREAST TOMOSYNTHESIS BI: CPT

## 2019-12-03 PROCEDURE — 77063 BREAST TOMOSYNTHESIS BI: CPT | Performed by: RADIOLOGY

## 2019-12-03 PROCEDURE — 77067 SCR MAMMO BI INCL CAD: CPT

## 2019-12-03 PROCEDURE — 77067 SCR MAMMO BI INCL CAD: CPT | Performed by: RADIOLOGY

## 2020-03-05 ENCOUNTER — OFFICE VISIT (OUTPATIENT)
Dept: RETAIL CLINIC | Facility: CLINIC | Age: 45
End: 2020-03-05

## 2020-03-05 VITALS
HEIGHT: 65 IN | HEART RATE: 95 BPM | TEMPERATURE: 98.8 F | WEIGHT: 175 LBS | DIASTOLIC BLOOD PRESSURE: 71 MMHG | RESPIRATION RATE: 16 BRPM | SYSTOLIC BLOOD PRESSURE: 112 MMHG | BODY MASS INDEX: 29.16 KG/M2 | OXYGEN SATURATION: 98 %

## 2020-03-05 DIAGNOSIS — R68.89 FLU-LIKE SYMPTOMS: Primary | ICD-10-CM

## 2020-03-05 DIAGNOSIS — J02.9 SORE THROAT: ICD-10-CM

## 2020-03-05 LAB
EXPIRATION DATE: NORMAL
EXPIRATION DATE: NORMAL
FLUAV AG NPH QL: NEGATIVE
FLUBV AG NPH QL: NEGATIVE
INTERNAL CONTROL: NORMAL
INTERNAL CONTROL: NORMAL
Lab: NORMAL
Lab: NORMAL
S PYO AG THROAT QL: NEGATIVE

## 2020-03-05 PROCEDURE — 99213 OFFICE O/P EST LOW 20 MIN: CPT | Performed by: NURSE PRACTITIONER

## 2020-03-05 PROCEDURE — 87804 INFLUENZA ASSAY W/OPTIC: CPT | Performed by: NURSE PRACTITIONER

## 2020-03-05 PROCEDURE — 87880 STREP A ASSAY W/OPTIC: CPT | Performed by: NURSE PRACTITIONER

## 2020-03-05 RX ORDER — OSELTAMIVIR PHOSPHATE 75 MG/1
75 CAPSULE ORAL 2 TIMES DAILY
Qty: 10 CAPSULE | Refills: 0 | Status: SHIPPED | OUTPATIENT
Start: 2020-03-05 | End: 2020-03-10

## 2020-03-05 RX ORDER — IBUPROFEN 800 MG/1
800 TABLET ORAL EVERY 8 HOURS PRN
Qty: 90 TABLET | Refills: 0 | Status: SHIPPED | OUTPATIENT
Start: 2020-03-05 | End: 2020-10-30

## 2020-03-05 RX ORDER — PREDNISONE 10 MG/1
TABLET ORAL DAILY
Qty: 21 EACH | Refills: 0 | Status: SHIPPED | OUTPATIENT
Start: 2020-03-05 | End: 2020-03-11

## 2020-03-05 NOTE — PROGRESS NOTES
"Subjective   Valery Soriano is a 44 y.o. female.     Multiple exposures over the past few days    Flu Symptoms   This is a new problem. The current episode started yesterday. The problem occurs constantly. The problem has been rapidly worsening. Associated symptoms include anorexia, chills, congestion, coughing (mild), fatigue, a fever (low grade), headaches, myalgias and a sore throat (severe). Pertinent negatives include no abdominal pain, arthralgias, chest pain, nausea, neck pain, rash, swollen glands, vomiting or weakness. The symptoms are aggravated by coughing, eating and swallowing. She has tried NSAIDs for the symptoms. The treatment provided mild relief.        The following portions of the patient's history were reviewed and updated as appropriate: allergies, current medications, past medical history, past social history, past surgical history and problem list.    Review of Systems   Constitutional: Positive for appetite change, chills, fatigue and fever (low grade).   HENT: Positive for congestion and sore throat (severe). Negative for ear pain, postnasal drip, rhinorrhea, sinus pressure, sneezing and trouble swallowing.    Eyes: Negative.    Respiratory: Positive for cough (mild). Negative for chest tightness, shortness of breath and wheezing.    Cardiovascular: Negative.  Negative for chest pain.   Gastrointestinal: Positive for anorexia. Negative for abdominal pain, diarrhea, nausea and vomiting.   Musculoskeletal: Positive for myalgias. Negative for arthralgias and neck pain.   Skin: Negative.  Negative for rash.   Neurological: Positive for headaches. Negative for weakness.        /71 (BP Location: Right arm, Patient Position: Sitting, Cuff Size: Adult)   Pulse 95   Temp 98.8 °F (37.1 °C) (Temporal)   Ht 165.1 cm (65\")   Wt 79.4 kg (175 lb)   LMP  (LMP Unknown)   SpO2 98%   Breastfeeding No   BMI 29.12 kg/m²      Objective   Physical Exam   Constitutional: She is oriented to person, " place, and time. She appears well-developed and well-nourished. No distress.   HENT:   Head: Normocephalic.   Right Ear: Tympanic membrane, external ear and ear canal normal. No drainage, swelling or tenderness. Tympanic membrane is not erythematous and not bulging.   Left Ear: Tympanic membrane, external ear and ear canal normal. No drainage, swelling or tenderness. Tympanic membrane is not erythematous and not bulging.   Nose: Mucosal edema and rhinorrhea present. Right sinus exhibits no maxillary sinus tenderness and no frontal sinus tenderness. Left sinus exhibits no maxillary sinus tenderness and no frontal sinus tenderness.   Mouth/Throat: Uvula is midline, oropharynx is clear and moist and mucous membranes are normal. Tonsils are 0 on the right. Tonsils are 0 on the left. No tonsillar exudate.   Neck: Normal range of motion. Neck supple.   Cardiovascular: Regular rhythm, S1 normal, S2 normal and normal heart sounds. Tachycardia present.   Pulmonary/Chest: Effort normal and breath sounds normal. No stridor. No respiratory distress. She has no decreased breath sounds. She has no wheezes. She has no rhonchi. She has no rales.   Abdominal: Soft. Normal appearance and bowel sounds are normal. She exhibits no distension. There is no tenderness. There is no rebound and no guarding.   Lymphadenopathy:        Head (right side): No tonsillar adenopathy present.        Head (left side): No tonsillar adenopathy present.     She has cervical adenopathy.   Neurological: She is alert and oriented to person, place, and time.   Skin: Skin is warm and dry. No rash noted. She is not diaphoretic.   Psychiatric: She has a normal mood and affect. Her speech is normal and behavior is normal. Thought content normal.   Vitals reviewed.       Results for orders placed or performed in visit on 03/05/20   POC Rapid Strep A   Result Value Ref Range    Rapid Strep A Screen Negative Negative, VALID, INVALID, Not Performed    Internal  Control Passed Passed    Lot Number [id number]     Expiration Date 07/15/2022    POC Influenza A / B   Result Value Ref Range    Rapid Influenza A Ag Negative Negative    Rapid Influenza B Ag Negative Negative    Internal Control Passed Passed    Lot Number [id number]     Expiration Date 12,042,022         Assessment/Plan   Valery was seen today for flu symptoms.    Diagnoses and all orders for this visit:    Flu-like symptoms  -     POC Influenza A / B  -     oseltamivir (TAMIFLU) 75 MG capsule; Take 1 capsule by mouth 2 (Two) Times a Day for 5 days.  -     predniSONE (DELTASONE) 10 MG (21) tablet pack; Take  by mouth Daily for 6 days. Use as directed on package  -     ibuprofen (ADVIL,MOTRIN) 800 MG tablet; Take 1 tablet by mouth Every 8 (Eight) Hours As Needed for Mild Pain .    Sore throat  -     POC Rapid Strep A  -     predniSONE (DELTASONE) 10 MG (21) tablet pack; Take  by mouth Daily for 6 days. Use as directed on package  -     ibuprofen (ADVIL,MOTRIN) 800 MG tablet; Take 1 tablet by mouth Every 8 (Eight) Hours As Needed for Mild Pain .

## 2020-03-11 NOTE — PROGRESS NOTES
Pt present to clinic with work paperwork for a missed day post appt on 3/5. Pt states that she only missed one day post appointment.   Paperwork filled out, scanned to chart and provided to patient.

## 2020-08-11 ENCOUNTER — TELEPHONE (OUTPATIENT)
Dept: FAMILY MEDICINE CLINIC | Facility: CLINIC | Age: 45
End: 2020-08-11

## 2020-08-11 RX ORDER — NITROFURANTOIN 25; 75 MG/1; MG/1
100 CAPSULE ORAL 2 TIMES DAILY
Qty: 14 CAPSULE | Refills: 0 | Status: SHIPPED | OUTPATIENT
Start: 2020-08-11 | End: 2020-10-30

## 2020-10-05 ENCOUNTER — OFFICE VISIT (OUTPATIENT)
Dept: OBSTETRICS AND GYNECOLOGY | Facility: CLINIC | Age: 45
End: 2020-10-05

## 2020-10-05 VITALS
WEIGHT: 176 LBS | DIASTOLIC BLOOD PRESSURE: 72 MMHG | SYSTOLIC BLOOD PRESSURE: 110 MMHG | HEIGHT: 64 IN | BODY MASS INDEX: 30.05 KG/M2

## 2020-10-05 DIAGNOSIS — Z12.31 BREAST CANCER SCREENING BY MAMMOGRAM: ICD-10-CM

## 2020-10-05 DIAGNOSIS — Z80.3 FAMILY HISTORY OF BREAST CANCER: ICD-10-CM

## 2020-10-05 DIAGNOSIS — Z97.5 IUD (INTRAUTERINE DEVICE) IN PLACE: ICD-10-CM

## 2020-10-05 DIAGNOSIS — Z01.419 WOMEN'S ANNUAL ROUTINE GYNECOLOGICAL EXAMINATION: Primary | ICD-10-CM

## 2020-10-05 PROCEDURE — 99396 PREV VISIT EST AGE 40-64: CPT | Performed by: OBSTETRICS & GYNECOLOGY

## 2020-10-05 NOTE — PROGRESS NOTES
GYN Annual Exam     CC - Here for annual exam.        HPI  Valery Soriano is a 45 y.o. female, , who presents for annual well woman exam. No LMP recorded. Patient has had an implant..  Periods are absent secondary to birth control.  Dysmenorrhea:none.  Patient reports problems with: none.  Partner Status: Marital Status: .  New Partners since last visit: no.  Desires STD Screening: no.    Additional OB/GYN History   Current contraception: contraceptive methods: IUD.  Insertion date: 2019  Desires to: continue contraception  Last Pap :   Last Completed Pap Smear       Status Date      PAP SMEAR Done 2019 Dr daams     Patient has more history with this topic...        History of abnormal Pap smear: no  Family history of uterine, colon, breast, or ovarian cancer: yes - maternal aunt had breast and colon cancer  Performs monthly Self-Breast Exam: yes  Last mammogram:   Last Completed Mammogram       Status Date      MAMMOGRAM Done 12/3/2019 MAMMO SCREENING DIGITAL TOMOSYNTHESIS BILATERAL W CAD     Patient has more history with this topic...         Exercises Regularly: yes  Feelings of Anxiety or Depression: no  Tobacco Usage?: No   OB History        2    Para   2    Term   2            AB        Living           SAB        TAB        Ectopic        Molar        Multiple        Live Births                    Health Maintenance   Topic Date Due   • Annual Gynecologic Pelvic and Breast Exam  1977   • HEPATITIS C SCREENING  2016   • PAP SMEAR  2020   • INFLUENZA VACCINE  2020   • ANNUAL PHYSICAL  10/26/2020   • MAMMOGRAM  2020   • TDAP/TD VACCINES (2 - Td) 2027   • Pneumococcal Vaccine 0-64  Aged Out       The additional following portions of the patient's history were reviewed and updated as appropriate: allergies, current medications, past family history, past medical history, past social history, past surgical history and problem list.    Review  "of Systems   Constitutional: Negative.    HENT: Negative.    Eyes: Negative.    Respiratory: Negative.    Cardiovascular: Negative.    Gastrointestinal: Negative.    Endocrine: Negative.    Genitourinary: Positive for amenorrhea.   Musculoskeletal: Negative.    Skin: Negative.    Allergic/Immunologic: Negative.    Neurological: Negative.    Hematological: Negative.    Psychiatric/Behavioral: Negative.      All other systems reviewed and are negative.     I have reviewed and agree with the HPI, ROS, and historical information as entered above. Kandace Vigil MD    Objective   /72   Ht 162.6 cm (64\")   Wt 79.8 kg (176 lb)   BMI 30.21 kg/m²     Physical Exam  Vitals signs and nursing note reviewed. Exam conducted with a chaperone present.   Constitutional:       Appearance: She is well-developed.   HENT:      Head: Normocephalic and atraumatic.   Neck:      Musculoskeletal: Normal range of motion. No muscular tenderness.      Thyroid: No thyroid mass or thyromegaly.   Cardiovascular:      Rate and Rhythm: Normal rate and regular rhythm.      Heart sounds: No murmur.   Pulmonary:      Effort: Pulmonary effort is normal. No retractions.      Breath sounds: Normal breath sounds. No wheezing, rhonchi or rales.   Chest:      Chest wall: No mass or tenderness.      Breasts:         Right: Normal. No mass, nipple discharge, skin change or tenderness.         Left: Normal. No mass, nipple discharge, skin change or tenderness.      Comments: Implants noted  Abdominal:      General: Bowel sounds are normal.      Palpations: Abdomen is soft. Abdomen is not rigid. There is no mass.      Tenderness: There is no abdominal tenderness. There is no guarding.      Hernia: No hernia is present. There is no hernia in the left inguinal area or right inguinal area.   Genitourinary:     General: Normal vulva.      Exam position: Lithotomy position.      Pubic Area: No rash.       Labia:         Right: No rash, tenderness or " lesion.         Left: No rash, tenderness or lesion.       Urethra: No urethral pain or urethral swelling.      Vagina: Normal. No vaginal discharge or lesions.      Cervix: No cervical motion tenderness, discharge, lesion or cervical bleeding.      Uterus: Normal. Not enlarged, not fixed and not tender.       Adnexa:         Right: No mass, tenderness or fullness.          Left: No mass, tenderness or fullness.        Rectum: No external hemorrhoid.      Comments: IUD strings seen 4 cm from office  Neurological:      Mental Status: She is alert and oriented to person, place, and time.   Psychiatric:         Behavior: Behavior normal.            Assessment and Plan    Problem List Items Addressed This Visit        Other    Family history of breast cancer    Overview     And 2 maternal aunts that were diagnosed in their 60s.         Relevant Orders    Mammo Screening Digital Tomosynthesis Bilateral With CAD    IUD (intrauterine device) in place    Overview     Mirena placed 2/25/2019.  Lot number [id number]           Other Visit Diagnoses     Women's annual routine gynecological examination    -  Primary    Relevant Orders    Pap IG, Rfx HPV ASCU    Breast cancer screening by mammogram        Relevant Orders    Mammo Screening Digital Tomosynthesis Bilateral With CAD          1. GYN annual well woman exam.   2. Reviewed monthly self breast exams.  Instructed to call with lumps, pain, or breast discharge.    3. Ordered Mammogram today  4. Recommended use of Vitamin D replacement and getting adequate calcium in her diet. (1500mg)  5. Reviewed exercise as a preventative health measures.   6. Reccommended Flu Vaccine in Fall of each year.  7. RTC in 1 year or PRN with problems.    Kandace Vigil MD  10/05/2020

## 2020-10-15 DIAGNOSIS — Z01.419 WOMEN'S ANNUAL ROUTINE GYNECOLOGICAL EXAMINATION: ICD-10-CM

## 2020-10-30 ENCOUNTER — OFFICE VISIT (OUTPATIENT)
Dept: FAMILY MEDICINE CLINIC | Facility: CLINIC | Age: 45
End: 2020-10-30

## 2020-10-30 VITALS
RESPIRATION RATE: 14 BRPM | BODY MASS INDEX: 30.05 KG/M2 | DIASTOLIC BLOOD PRESSURE: 70 MMHG | SYSTOLIC BLOOD PRESSURE: 112 MMHG | WEIGHT: 176 LBS | HEIGHT: 64 IN | OXYGEN SATURATION: 98 % | HEART RATE: 85 BPM | TEMPERATURE: 98.6 F

## 2020-10-30 DIAGNOSIS — Z00.00 ROUTINE GENERAL MEDICAL EXAMINATION AT A HEALTH CARE FACILITY: Primary | ICD-10-CM

## 2020-10-30 LAB
BILIRUB BLD-MCNC: NEGATIVE MG/DL
CLARITY, POC: CLEAR
COLOR UR: YELLOW
GLUCOSE UR STRIP-MCNC: NEGATIVE MG/DL
KETONES UR QL: NEGATIVE
LEUKOCYTE EST, POC: NEGATIVE
NITRITE UR-MCNC: NEGATIVE MG/ML
PH UR: 7 [PH] (ref 5–8)
PROT UR STRIP-MCNC: NEGATIVE MG/DL
RBC # UR STRIP: ABNORMAL /UL
SP GR UR: 1.01 (ref 1–1.03)
UROBILINOGEN UR QL: NORMAL

## 2020-10-30 PROCEDURE — 93005 ELECTROCARDIOGRAM TRACING: CPT | Performed by: PHYSICIAN ASSISTANT

## 2020-10-30 PROCEDURE — 81003 URINALYSIS AUTO W/O SCOPE: CPT | Performed by: PHYSICIAN ASSISTANT

## 2020-10-30 PROCEDURE — 99396 PREV VISIT EST AGE 40-64: CPT | Performed by: PHYSICIAN ASSISTANT

## 2020-10-30 RX ORDER — CITALOPRAM 20 MG/1
20 TABLET ORAL DAILY
Qty: 90 TABLET | Refills: 3 | Status: SHIPPED | OUTPATIENT
Start: 2020-10-30 | End: 2021-01-22

## 2020-10-30 NOTE — PROGRESS NOTES
Subjective   Valery Soriano is a 45 y.o. female  Annual Exam (Not fasting. UTD on Pap, MMG. )      History of Present Illness    The following portions of the patient's history were reviewed and updated as appropriate: allergies, current medications, past social history and problem list    Review of Systems   Constitutional: Negative.    HENT: Negative.    Eyes: Negative.    Respiratory: Negative.    Cardiovascular: Negative.    Gastrointestinal: Negative.    Endocrine: Negative.    Genitourinary: Negative.    Musculoskeletal: Negative.    Skin: Negative.    Allergic/Immunologic: Negative.    Neurological: Negative.    Hematological: Negative.    Psychiatric/Behavioral: Negative.    All other systems reviewed and are negative.      Objective     Vitals:    10/30/20 1354   BP: 112/70   Pulse: 85   Resp: 14   Temp: 98.6 °F (37 °C)   SpO2: 98%       Physical Exam  Vitals signs and nursing note reviewed.   Constitutional:       Appearance: She is well-developed.   HENT:      Head: Normocephalic and atraumatic.      Right Ear: External ear normal.      Left Ear: External ear normal.      Nose: Nose normal.   Eyes:      Conjunctiva/sclera: Conjunctivae normal.      Pupils: Pupils are equal, round, and reactive to light.   Neck:      Musculoskeletal: Normal range of motion and neck supple.      Thyroid: No thyromegaly.      Vascular: No carotid bruit or JVD.   Cardiovascular:      Rate and Rhythm: Normal rate and regular rhythm.      Heart sounds: Normal heart sounds. No murmur.   Pulmonary:      Effort: Pulmonary effort is normal.      Breath sounds: Normal breath sounds.   Abdominal:      General: Bowel sounds are normal.      Palpations: Abdomen is soft. There is no mass.      Tenderness: There is no abdominal tenderness.   Musculoskeletal: Normal range of motion.   Lymphadenopathy:      Cervical: No cervical adenopathy.   Skin:     General: Skin is warm and dry.   Neurological:      Mental Status: She is alert and  oriented to person, place, and time.      Cranial Nerves: No cranial nerve deficit.      Deep Tendon Reflexes: Reflexes are normal and symmetric.       ECG 12 Lead    Date/Time: 10/30/2020 5:15 PM  Performed by: Giovany Sampson PA  Authorized by: Giovany Sampson PA   Comparison: not compared with previous ECG   Conduction: conduction normal  ST Segments: ST segments normal  T Waves: T waves normal    Clinical impression: normal ECG            Assessment/Plan     Diagnoses and all orders for this visit:    1. Routine general medical examination at a health care facility (Primary)  -     POCT urinalysis dipstick, automated  -     citalopram (CeleXA) 20 MG tablet; Take 1 tablet by mouth Daily.  Dispense: 90 tablet; Refill: 3  -     Cancel: Comprehensive Metabolic Panel; Future  -     Cancel: CBC & Differential; Future  -     Cancel: Lipid Panel; Future  -     Cancel: TSH; Future  -     Hepatitis C Antibody; Future  -     Comprehensive Metabolic Panel; Future  -     CBC & Differential; Future  -     Lipid Panel; Future  -     TSH; Future    Preventive medicine discussed, diet, exercise, healthy living discussed at length

## 2020-11-16 ENCOUNTER — LAB (OUTPATIENT)
Dept: LAB | Facility: HOSPITAL | Age: 45
End: 2020-11-16

## 2020-11-16 DIAGNOSIS — Z00.00 ROUTINE GENERAL MEDICAL EXAMINATION AT A HEALTH CARE FACILITY: ICD-10-CM

## 2020-11-16 LAB
ALBUMIN SERPL-MCNC: 4.5 G/DL (ref 3.5–5.2)
ALBUMIN/GLOB SERPL: 2 G/DL
ALP SERPL-CCNC: 61 U/L (ref 39–117)
ALT SERPL W P-5'-P-CCNC: 16 U/L (ref 1–33)
ANION GAP SERPL CALCULATED.3IONS-SCNC: 12.2 MMOL/L (ref 5–15)
AST SERPL-CCNC: 16 U/L (ref 1–32)
BASOPHILS # BLD AUTO: 0.02 10*3/MM3 (ref 0–0.2)
BASOPHILS NFR BLD AUTO: 0.2 % (ref 0–1.5)
BILIRUB SERPL-MCNC: 0.5 MG/DL (ref 0–1.2)
BUN SERPL-MCNC: 11 MG/DL (ref 6–20)
BUN/CREAT SERPL: 14.5 (ref 7–25)
CALCIUM SPEC-SCNC: 10.2 MG/DL (ref 8.6–10.5)
CHLORIDE SERPL-SCNC: 103 MMOL/L (ref 98–107)
CHOLEST SERPL-MCNC: 184 MG/DL (ref 0–200)
CO2 SERPL-SCNC: 24.8 MMOL/L (ref 22–29)
CREAT SERPL-MCNC: 0.76 MG/DL (ref 0.57–1)
DEPRECATED RDW RBC AUTO: 39.5 FL (ref 37–54)
EOSINOPHIL # BLD AUTO: 0.26 10*3/MM3 (ref 0–0.4)
EOSINOPHIL NFR BLD AUTO: 2.8 % (ref 0.3–6.2)
ERYTHROCYTE [DISTWIDTH] IN BLOOD BY AUTOMATED COUNT: 12.7 % (ref 12.3–15.4)
GFR SERPL CREATININE-BSD FRML MDRD: 83 ML/MIN/1.73
GLOBULIN UR ELPH-MCNC: 2.3 GM/DL
GLUCOSE SERPL-MCNC: 91 MG/DL (ref 65–99)
HCT VFR BLD AUTO: 39.9 % (ref 34–46.6)
HDLC SERPL-MCNC: 81 MG/DL (ref 40–60)
HGB BLD-MCNC: 13.9 G/DL (ref 12–15.9)
IMM GRANULOCYTES # BLD AUTO: 0.03 10*3/MM3 (ref 0–0.05)
IMM GRANULOCYTES NFR BLD AUTO: 0.3 % (ref 0–0.5)
LDLC SERPL CALC-MCNC: 92 MG/DL (ref 0–100)
LDLC/HDLC SERPL: 1.13 {RATIO}
LYMPHOCYTES # BLD AUTO: 1.44 10*3/MM3 (ref 0.7–3.1)
LYMPHOCYTES NFR BLD AUTO: 15.4 % (ref 19.6–45.3)
MCH RBC QN AUTO: 30.8 PG (ref 26.6–33)
MCHC RBC AUTO-ENTMCNC: 34.8 G/DL (ref 31.5–35.7)
MCV RBC AUTO: 88.5 FL (ref 79–97)
MONOCYTES # BLD AUTO: 0.53 10*3/MM3 (ref 0.1–0.9)
MONOCYTES NFR BLD AUTO: 5.7 % (ref 5–12)
NEUTROPHILS NFR BLD AUTO: 7.07 10*3/MM3 (ref 1.7–7)
NEUTROPHILS NFR BLD AUTO: 75.6 % (ref 42.7–76)
NRBC BLD AUTO-RTO: 0 /100 WBC (ref 0–0.2)
PLATELET # BLD AUTO: 378 10*3/MM3 (ref 140–450)
PMV BLD AUTO: 11.5 FL (ref 6–12)
POTASSIUM SERPL-SCNC: 4.7 MMOL/L (ref 3.5–5.2)
PROT SERPL-MCNC: 6.8 G/DL (ref 6–8.5)
RBC # BLD AUTO: 4.51 10*6/MM3 (ref 3.77–5.28)
SODIUM SERPL-SCNC: 140 MMOL/L (ref 136–145)
TRIGL SERPL-MCNC: 57 MG/DL (ref 0–150)
TSH SERPL DL<=0.05 MIU/L-ACNC: 5 UIU/ML (ref 0.27–4.2)
VLDLC SERPL-MCNC: 11 MG/DL (ref 5–40)
WBC # BLD AUTO: 9.35 10*3/MM3 (ref 3.4–10.8)

## 2020-11-16 PROCEDURE — 80061 LIPID PANEL: CPT

## 2020-11-16 PROCEDURE — 80053 COMPREHEN METABOLIC PANEL: CPT

## 2020-11-16 PROCEDURE — 86803 HEPATITIS C AB TEST: CPT

## 2020-11-16 PROCEDURE — 84443 ASSAY THYROID STIM HORMONE: CPT

## 2020-11-16 PROCEDURE — 85025 COMPLETE CBC W/AUTO DIFF WBC: CPT

## 2020-11-16 PROCEDURE — 36415 COLL VENOUS BLD VENIPUNCTURE: CPT

## 2020-11-17 LAB — HCV AB SER DONR QL: NORMAL

## 2020-11-23 ENCOUNTER — TELEPHONE (OUTPATIENT)
Dept: FAMILY MEDICINE CLINIC | Facility: CLINIC | Age: 45
End: 2020-11-23

## 2020-11-23 DIAGNOSIS — E03.9 HYPOTHYROIDISM, UNSPECIFIED TYPE: Primary | ICD-10-CM

## 2020-11-23 RX ORDER — LEVOTHYROXINE SODIUM 0.03 MG/1
25 TABLET ORAL DAILY
Qty: 30 TABLET | Refills: 11 | Status: SHIPPED | OUTPATIENT
Start: 2020-11-23 | End: 2021-11-12 | Stop reason: SDUPTHER

## 2020-11-23 NOTE — TELEPHONE ENCOUNTER
PATIENT CALLED AND STATED SHE RECEIVED A LETTER FROM THE OFFICE TELLING HER SHE NEEDS TO GET SET UP ON A LOW DOES THYROID MEDICATION.     PLEASE CALL AND ADVISE AT  [phone]

## 2020-12-14 ENCOUNTER — HOSPITAL ENCOUNTER (OUTPATIENT)
Dept: MAMMOGRAPHY | Facility: HOSPITAL | Age: 45
Discharge: HOME | End: 2020-12-14
Admitting: OBSTETRICS & GYNECOLOGY

## 2020-12-14 DIAGNOSIS — Z12.31 BREAST CANCER SCREENING BY MAMMOGRAM: ICD-10-CM

## 2020-12-14 DIAGNOSIS — Z80.3 FAMILY HISTORY OF BREAST CANCER: ICD-10-CM

## 2020-12-14 PROCEDURE — 77063 BREAST TOMOSYNTHESIS BI: CPT

## 2020-12-14 PROCEDURE — 77067 SCR MAMMO BI INCL CAD: CPT

## 2020-12-14 PROCEDURE — 77063 BREAST TOMOSYNTHESIS BI: CPT | Performed by: RADIOLOGY

## 2020-12-14 PROCEDURE — 77067 SCR MAMMO BI INCL CAD: CPT | Performed by: RADIOLOGY

## 2021-01-05 ENCOUNTER — IMMUNIZATION (OUTPATIENT)
Dept: VACCINE CLINIC | Facility: HOSPITAL | Age: 46
End: 2021-01-05

## 2021-01-05 PROCEDURE — 91300 HC SARSCOV02 VAC 30MCG/0.3ML IM: CPT

## 2021-01-05 PROCEDURE — 0001A: CPT

## 2021-01-22 DIAGNOSIS — Z00.00 ROUTINE GENERAL MEDICAL EXAMINATION AT A HEALTH CARE FACILITY: ICD-10-CM

## 2021-01-22 RX ORDER — CITALOPRAM 20 MG/1
TABLET ORAL
Qty: 90 TABLET | Refills: 3 | Status: SHIPPED | OUTPATIENT
Start: 2021-01-22 | End: 2021-02-17 | Stop reason: SDUPTHER

## 2021-01-26 ENCOUNTER — IMMUNIZATION (OUTPATIENT)
Dept: VACCINE CLINIC | Facility: HOSPITAL | Age: 46
End: 2021-01-26

## 2021-01-26 PROCEDURE — 0002A: CPT | Performed by: INTERNAL MEDICINE

## 2021-01-26 PROCEDURE — 91300 HC SARSCOV02 VAC 30MCG/0.3ML IM: CPT | Performed by: INTERNAL MEDICINE

## 2021-02-17 DIAGNOSIS — Z00.00 ROUTINE GENERAL MEDICAL EXAMINATION AT A HEALTH CARE FACILITY: ICD-10-CM

## 2021-02-17 RX ORDER — CITALOPRAM 20 MG/1
20 TABLET ORAL DAILY
Qty: 90 TABLET | Refills: 3 | Status: SHIPPED | OUTPATIENT
Start: 2021-02-17 | End: 2022-04-05 | Stop reason: SDUPTHER

## 2021-05-19 ENCOUNTER — TELEMEDICINE (OUTPATIENT)
Dept: FAMILY MEDICINE CLINIC | Facility: CLINIC | Age: 46
End: 2021-05-19

## 2021-05-19 DIAGNOSIS — R63.5 WEIGHT GAIN: Primary | ICD-10-CM

## 2021-05-19 DIAGNOSIS — E66.9 OBESITY (BMI 30.0-34.9): ICD-10-CM

## 2021-05-19 PROCEDURE — 99212 OFFICE O/P EST SF 10 MIN: CPT | Performed by: PHYSICIAN ASSISTANT

## 2021-05-19 RX ORDER — PHENTERMINE HYDROCHLORIDE 37.5 MG/1
37.5 CAPSULE ORAL EVERY MORNING
Qty: 30 CAPSULE | Refills: 3 | Status: SHIPPED | OUTPATIENT
Start: 2021-05-19 | End: 2022-02-16 | Stop reason: SDUPTHER

## 2021-05-19 NOTE — PROGRESS NOTES
Subjective   Valery Soriano is a 45 y.o. female  Weight Gain  Video visit    History of Present Illness  Patient is a pleasant 45-year-old female who comes presents consents for video visit, patient presents for weight gain her BMI today is 33, weight 194 patient states that she has been trying to watch her diet and exercise but weight gain is slowly occurred over the last couple of months she started diet and exercise program  The following portions of the patient's history were reviewed and updated as appropriate: allergies, current medications, past social history and problem list    Review of Systems   Constitutional: Negative for appetite change, diaphoresis, fatigue and unexpected weight change.   Eyes: Negative for visual disturbance.   Respiratory: Negative for cough, chest tightness and shortness of breath.    Cardiovascular: Negative for chest pain, palpitations and leg swelling.   Gastrointestinal: Negative for diarrhea, nausea and vomiting.   Endocrine: Negative for polydipsia, polyphagia and polyuria.   Skin: Negative for color change and rash.   Neurological: Negative for dizziness, syncope, weakness, light-headedness, numbness and headaches.       Objective     There were no vitals filed for this visit.    Physical Exam  Constitutional:       Appearance: Normal appearance.   Neurological:      Mental Status: She is alert.   Psychiatric:         Mood and Affect: Mood normal.         Behavior: Behavior normal.         Thought Content: Thought content normal.         Judgment: Judgment normal.         Assessment/Plan     Diagnoses and all orders for this visit:    1. Weight gain (Primary)    2. Obesity (BMI 30.0-34.9)    #1 phentermine 37.5 mg 1 p.o. every day dispense 30x2 refills    Time spent with patient 15 minutes discussed treatment plan long-term treatment plan discussed ways improve overall symptoms of weight gain we talked about diet and exercise she is to follow-up in 1 month to get a weight  check

## 2021-06-01 NOTE — ADDENDUM NOTE
Addended by: RON CHAVEZ on: 7/5/2017 11:14 AM     Modules accepted: Orders    
Detail Level: Detailed

## 2021-08-04 ENCOUNTER — TRANSCRIBE ORDERS (OUTPATIENT)
Dept: PHYSICAL THERAPY | Facility: HOSPITAL | Age: 46
End: 2021-08-04

## 2021-08-04 ENCOUNTER — HOSPITAL ENCOUNTER (OUTPATIENT)
Dept: PHYSICAL THERAPY | Facility: HOSPITAL | Age: 46
Setting detail: THERAPIES SERIES
Discharge: HOME | End: 2021-08-04

## 2021-08-04 DIAGNOSIS — M47.816 LUMBAR SPONDYLOSIS: Primary | ICD-10-CM

## 2021-08-04 DIAGNOSIS — M43.06 LUMBAR SPONDYLOLYSIS: Primary | ICD-10-CM

## 2021-08-04 PROCEDURE — 97161 PT EVAL LOW COMPLEX 20 MIN: CPT | Performed by: GENERAL ACUTE CARE HOSPITAL

## 2021-08-04 NOTE — THERAPY TREATMENT NOTE
Outpatient Physical Therapy Ortho Initial Evaluation   [place]     Patient Name: Valery Soriano  : 1975  MRN: [id number]  Today's Date: 2021      Visit Date: 2021    Patient Active Problem List   Diagnosis   • Family history of breast cancer   • IUD (intrauterine device) in place        Past Medical History:   Diagnosis Date   • Anxiety 2015    celexa   • Fibrocystic breast disease    • IUD (intrauterine device) in place     Mirena   • Obesity     somewhat overweight   • Ovarian cyst    • Screening breast examination     self admits   • Visual impairment     contacts        Past Surgical History:   Procedure Laterality Date   • AUGMENTATION MAMMAPLASTY Bilateral     silicone   • BREAST SURGERY     •  SECTION     • CHOLECYSTECTOMY  2011    laparoscopic   • COSMETIC SURGERY  2013   • GALLBLADDER SURGERY     • INTRAUTERINE DEVICE INSERTION     • OTHER SURGICAL HISTORY      IUD removal       Visit Dx:     ICD-10-CM ICD-9-CM   1. Lumbar spondylolysis  M43.06 738.4         Patient History     Row Name 21 1615             History    Chief Complaint  Pain  -HP      Type of Pain  Back pain  -HP      Date Current Problem(s) Began  21  -HP      Brief Description of Current Complaint  Patient stated that in mid 2021 she started noticing low back pain.  She is unaware of anything that may have caused this back pain including fall, trauma, or any other history to the low back.  Her pain is the most with standing and performing trunk extension.  -HP      Patient/Caregiver Goals  Relieve pain;Improve mobility;Know what to do to help the symptoms  -HP      Hand Dominance  right-handed  -HP      Occupation/sports/leisure activities  Works at mammography x-ray.  -HP         Pain     Pain Location  Back  -HP      Pain at Present  2  -HP      Pain at Best  0  -HP      Pain at Worst  7  -HP      Pain Frequency  Intermittent  -HP      Pain Description   Aching;Discomfort;Tightness  -HP      What Performance Factors Make the Current Problem(s) WORSE?  Extension of the trunk  -HP      Pain Comments  Pain is along the low back and along the upper portion of the buttock.  -HP      Tolerance Time- Sitting  Pain increases.  -HP      Is your sleep disturbed?  No  -HP      Difficulties at work?  Mild discomfort noted  -HP         Fall Risk Assessment    Any falls in the past year:  No  -HP         Daily Activities    Primary Language  English  -HP      Pt Participated in POC and Goals  Yes  -HP        User Key  (r) = Recorded By, (t) = Taken By, (c) = Cosigned By    Initials Name Provider Type     Riki Travis, PT Physical Therapist          PT Ortho     Row Name 08/04/21 4427       Precautions and Contraindications    Precautions/Limitations  no known precautions/limitations  -HP       Posture/Observations    Alignment Options  Lumbar lordosis  -HP    Lumbar lordosis  Moderate  -HP    Posture/Observations Comments  Patient with mild to moderate TTP along the lumbar paraspinals/QL.  -HP       Quarter Clearing    Quarter Clearing  Lower Quarter Clearing  -HP       Sensory Screen for Light Touch- Lower Quarter Clearing    L1 (inguinal area)  Bilateral:;Intact  -HP    L2 (anterior mid thigh)  Bilateral:;Intact  -HP    L3 (distal anterior thigh)  Bilateral:;Intact  -HP    L4 (medial lower leg/foot)  Bilateral:;Intact  -HP    L5 (lateral lower leg/great toe)  Bilateral:;Intact  -HP    S1 (bottom of foot)  Bilateral:;Intact  -HP       Myotomal Screen- Lower Quarter Clearing    Hip flexion (L2)  WNL  -HP    Knee extension (L3)  WNL  -HP    Ankle DF (L4)  WNL  -HP    Great toe extension (L5)  WNL  -HP    Ankle PF (S1)  WNL  -HP    Knee flexion (S2)  WNL  -HP       General ROM    Head/Neck/Trunk  Trunk Extension;Trunk Flexion;Trunk Lt Lateral Flexion;Trunk Rt Lateral Flexion;Trunk Lt Rotation;Trunk Rt Rotation  -HP       Head/Neck/Trunk    Trunk Extension AROM  Mild limitation  with pain  -HP    Trunk Flexion AROM  WNL  -HP    Trunk Lt Lateral Flexion AROM  WNL  -HP    Trunk Rt Lateral Flexion AROM  WNL  -HP    Trunk Lt Rotation AROM  WNL  -HP    Trunk Rt Rotation AROM  WNL with tightness  -HP      User Key  (r) = Recorded By, (t) = Taken By, (c) = Cosigned By    Initials Name Provider Type    Riki Chappell, PT Physical Therapist                      Therapy Education  Education Details: HEP included: Randy crunch, cat camel, child pose, pelvic tilts.  Given: HEP, Symptoms/condition management, Pain management  Program: New  How Provided: Verbal, Demonstration, Written  Provided to: Patient  Level of Understanding: Teach back education performed, Verbalized, Demonstrated     PT OP Goals     Row Name 08/04/21 1615          PT Short Term Goals    STG Date to Achieve  08/25/21  -HP     STG 1  Patient to report improvement of symptoms by 50% or greater.  -HP     STG 1 Progress  New  -HP     STG 2  Patient report adherence to HEP.  -HP     STG 2 Progress  New  -HP     STG 3  Patient to report no tenderness to palpation along the lumbar paraspinal/QL.  -HP     STG 3 Progress  New  -HP        Long Term Goals    LTG Date to Achieve  08/25/21  -HP     LTG 1  Patient to report improvement of symptoms by 75% or greater.  -HP     LTG 1 Progress  New  -HP     LTG 2  Patient reports independence with HEP.  -HP     LTG 2 Progress  New  -HP     LTG 3  Patient report no pain with standing or trunk extension in the lower back.  -HP     LTG 3 Progress  New  -HP        Time Calculation    PT Goal Re-Cert Due Date  11/02/21  -HP       User Key  (r) = Recorded By, (t) = Taken By, (c) = Cosigned By    Initials Name Provider Type    Riki Chappell, PT Physical Therapist          PT Assessment/Plan     Row Name 08/04/21 1615          PT Assessment    Functional Limitations  Performance in leisure activities;Performance in work activities  -     Impairments  Pain;Range of motion  -     Assessment  Comments  Patient is a 45-year-old female that presents with a low complexity and evolving case of low back pain.  She stated that in April 2021 she started noticing increased back pain.  She is unaware of any trauma or previous history that may have caused her back pain to start.  She is the most limited in trunk extension which causes pain.  She has no symptoms down into the lower extremities.  Upon evaluation she had mild to moderate TTP along the lumbar paraspinals and QL.  Her signs and symptoms are consistent with lumbar paraspinal/QL tendinitis.  -HP     Please refer to paper survey for additional self-reported information  Yes  -HP     Rehab Potential  Good  -HP     Patient/caregiver participated in establishment of treatment plan and goals  Yes  -HP        PT Plan    PT Frequency  2x/week  -HP     Predicted Duration of Therapy Intervention (PT)  8-10 visits  -     Planned CPT's?  PT EVAL LOW COMPLEXITY: 84304;PT RE-EVAL: 43494;PT THER PROC EA 15 MIN: 52259;PT THER ACT EA 15 MIN: 27931;PT MANUAL THERAPY EA 15 MIN: 95575  -     Physical Therapy Interventions (Optional Details)  joint mobilization;lumbar stabilization;manual therapy techniques;neuromuscular re-education;patient/family education;postural re-education;ROM (Range of Motion);strengthening;stretching  -     PT Plan Comments  Patient to benefit from PT services with focus on decreasing pain, improving functional mobility, and improving active range of motion of the lumbar spine without pain.  Consider ASTYM at next visit  -       User Key  (r) = Recorded By, (t) = Taken By, (c) = Cosigned By    Initials Name Provider Type    Riki Chappell, PT Physical Therapist                              Outcome Measure Options: Shavon Mckay  Modified Oswestry  Modified Oswestry Score/Comments: 4% (2/50)      Time Calculation:     Start Time: 1615  Untimed Charges  PT Eval/Re-eval Minutes: 60  Total Minutes  Untimed Charges Total Minutes: 60   Total  Minutes: 60     Therapy Charges for Today     Code Description Service Date Service Provider Modifiers Qty    60419366722 HC PT EVAL LOW COMPLEXITY 4 8/4/2021 Riki Travis, PT GP 1          PT G-Codes  Outcome Measure Options: Modifed Owestry  Modified Oswestry Score/Comments: 4% (2/50)         Riki Travis, PT  8/4/2021

## 2021-08-10 ENCOUNTER — HOSPITAL ENCOUNTER (OUTPATIENT)
Dept: PHYSICAL THERAPY | Facility: HOSPITAL | Age: 46
Setting detail: THERAPIES SERIES
Discharge: HOME | End: 2021-08-10

## 2021-08-10 DIAGNOSIS — M43.06 LUMBAR SPONDYLOLYSIS: Primary | ICD-10-CM

## 2021-08-10 PROCEDURE — 97140 MANUAL THERAPY 1/> REGIONS: CPT

## 2021-08-10 PROCEDURE — 97110 THERAPEUTIC EXERCISES: CPT

## 2021-08-10 NOTE — THERAPY TREATMENT NOTE
Outpatient Physical Therapy Ortho Treatment Note  [facility]     Patient Name: Valery Soriano  : 1975  MRN: [id number]  Today's Date: 8/10/2021      Visit Date: 08/10/2021    Visit Dx:    ICD-10-CM ICD-9-CM   1. Lumbar spondylolysis  M43.06 738.4       Patient Active Problem List   Diagnosis   • Family history of breast cancer   • IUD (intrauterine device) in place        Past Medical History:   Diagnosis Date   • Anxiety 2015    celexa   • Fibrocystic breast disease    • IUD (intrauterine device) in place     Mirena   • Obesity     somewhat overweight   • Ovarian cyst    • Screening breast examination     self admits   • Visual impairment     contacts        Past Surgical History:   Procedure Laterality Date   • AUGMENTATION MAMMAPLASTY Bilateral     silicone   • BREAST SURGERY     •  SECTION     • CHOLECYSTECTOMY  2011    laparoscopic   • COSMETIC SURGERY  2013   • GALLBLADDER SURGERY     • INTRAUTERINE DEVICE INSERTION     • OTHER SURGICAL HISTORY      IUD removal       PT Assessment/Plan     Row Name 08/10/21 0730          PT Assessment    Assessment Comments  Client tolerated all exercises well today. Client demonstrated improved tolerance to activities with completion of exercises, with discomfort only at end range extension. Client demonstrates good motor control with pelvic tilt, but requires occasional verbal cues for form and technique. Client responded well to soft tissue mobilization to lumbar paraspinals.  -MM (r) WL (t) MM (c)        PT Plan    PT Plan Comments  Continue per POC.  -MM (r) WL (t) MM (c)       User Key  (r) = Recorded By, (t) = Taken By, (c) = Cosigned By    Initials Name Provider Type    MM Heath Marinelli, PT Physical Therapist    Nicola Eduardo, PT Student PT Student          OP Exercises     Row Name 08/10/21 0730 08/10/21 0700          Subjective Comments    Subjective Comments  Client reports that she has been compliant with HEP and has had  no issues with any of her exercises. She states that she has noticed improvement since her evaluation.  -MM (r) WL (t) MM (c)  --        Subjective Pain    Able to rate subjective pain?  yes  -MM (r) WL (t) MM (c)  --     Pre-Treatment Pain Level  1  -MM (r) WL (t) MM (c)  --        Total Minutes    93912 - PT Therapeutic Exercise Minutes  30  -MM (r) WL (t) MM (c)  --     97709 - PT Manual Therapy Minutes  15  -MM (r) WL (t) MM (c)  --  -MM (r) WL (t) MM (c)        Exercise 1    Exercise Name 1  child's pose  -MM (r) WL (t) MM (c)  --     Reps 1  3  -MM (r) WL (t) MM (c)  --     Time 1  30 sec  -MM (r) WL (t) MM (c)  --        Exercise 2    Exercise Name 2  cat/camel  -MM (r) WL (t) MM (c)  --     Sets 2  2  -MM (r) WL (t) MM (c)  --     Reps 2  10  -MM (r) WL (t) MM (c)  --        Exercise 3    Exercise Name 3  prone pressups/with OP  -MM (r) WL (t) MM (c)  --     Sets 3  2  -MM (r) WL (t) MM (c)  --     Reps 3  10  -MM (r) WL (t) MM (c)  --        Exercise 4    Exercise Name 4  double knees to chest w ball  -MM (r) WL (t) MM (c)  --     Sets 4  2  -MM (r) WL (t) MM (c)  --     Reps 4  10  -MM (r) WL (t) MM (c)  --        Exercise 5    Exercise Name 5  bridges w pelvic tilt  -MM (r) WL (t) MM (c)  --     Sets 5  2  -MM (r) WL (t) MM (c)  --     Reps 5  10  -MM (r) WL (t) MM (c)  --        Exercise 6    Exercise Name 6  Randy crunch  -MM (r) WL (t) MM (c)  --     Sets 6  2  -MM (r) WL (t) MM (c)  --     Reps 6  10  -MM (r) WL (t) MM (c)  --        Exercise 7    Exercise Name 7  birddogs  -MM (r) WL (t) MM (c)  --     Sets 7  2  -MM (r) WL (t) MM (c)  --     Reps 7  10  -MM (r) WL (t) MM (c)  --       User Key  (r) = Recorded By, (t) = Taken By, (c) = Cosigned By    Initials Name Provider Type    MM Heath Marinelli, PT Physical Therapist    Nicola Eduardo, PT Student PT Student             Manual Rx (last 36 hours)      Manual Treatments     Row Name 08/10/21 0730 08/10/21 0700          Total Minutes     53592 - PT Manual Therapy Minutes  15  -MM (r) WL (t) MM (c)  --  -MM (r) WL (t) MM (c)        Manual Rx 1    Manual Rx 1 Location  lumbar spine  -MM (r) WL (t) MM (c)  --  -MM (r) WL (t) MM (c)     Manual Rx 1 Type  soft tissue mobilization using tools to lumbar paraspinals and superior gluteals. Client was prone and moderate pressure was utilized.  -MM (r) WL (t) MM (c)  --  -MM (r) WL (t) MM (c)     Manual Rx 1 Duration  15 min  -MM (r) WL (t) MM (c)  --  -MM (r) WL (t) MM (c)       User Key  (r) = Recorded By, (t) = Taken By, (c) = Cosigned By    Initials Name Provider Type    Heath Hoang, PT Physical Therapist     Nicola Yao, PT Student PT Student        Time Calculation:   Start Time: 0730  Timed Charges  60115 - PT Therapeutic Exercise Minutes: 30  18830 - PT Manual Therapy Minutes: 15  Total Minutes  Timed Charges Total Minutes: 45   Total Minutes: 45  Therapy Charges for Today     Code Description Service Date Service Provider Modifiers Qty    46913992884  PT THER PROC EA 15 MIN 8/10/2021 Nicola Yao, PT Student GP 2    [id number]  PT MANUAL THERAPY EA 15 MIN 8/10/2021 Nicola Yao, PT Student GP 1        Nicola Yao PT Student  8/10/2021

## 2021-08-17 ENCOUNTER — HOSPITAL ENCOUNTER (OUTPATIENT)
Dept: PHYSICAL THERAPY | Facility: HOSPITAL | Age: 46
Setting detail: THERAPIES SERIES
Discharge: HOME | End: 2021-08-17

## 2021-08-17 DIAGNOSIS — M43.06 LUMBAR SPONDYLOLYSIS: Primary | ICD-10-CM

## 2021-08-17 PROCEDURE — 97140 MANUAL THERAPY 1/> REGIONS: CPT | Performed by: GENERAL ACUTE CARE HOSPITAL

## 2021-08-17 PROCEDURE — 97110 THERAPEUTIC EXERCISES: CPT | Performed by: GENERAL ACUTE CARE HOSPITAL

## 2021-08-17 NOTE — THERAPY TREATMENT NOTE
Outpatient Physical Therapy Ortho Treatment Note  [facility]     Patient Name: Valery Soriano  : 1975  MRN: [id number]  Today's Date: 2021      Visit Date: 2021    Visit Dx:    ICD-10-CM ICD-9-CM   1. Lumbar spondylolysis  M43.06 738.4       Patient Active Problem List   Diagnosis   • Family history of breast cancer   • IUD (intrauterine device) in place        Past Medical History:   Diagnosis Date   • Anxiety 2015    celexa   • Fibrocystic breast disease    • IUD (intrauterine device) in place     Mirena   • Obesity     somewhat overweight   • Ovarian cyst    • Screening breast examination     self admits   • Visual impairment     contacts        Past Surgical History:   Procedure Laterality Date   • AUGMENTATION MAMMAPLASTY Bilateral     silicone   • BREAST SURGERY     •  SECTION     • CHOLECYSTECTOMY  2011    laparoscopic   • COSMETIC SURGERY  2013   • GALLBLADDER SURGERY     • INTRAUTERINE DEVICE INSERTION     • OTHER SURGICAL HISTORY      IUD removal                       PT Assessment/Plan     Row Name 21 2090          PT Assessment    Assessment Comments  Pt did very well with exercises today without an increase in pain. She responded favorably to soft tissue mobilization with the use of tools  -HP        PT Plan    PT Plan Comments  Continue per POC  -       User Key  (r) = Recorded By, (t) = Taken By, (c) = Cosigned By    Initials Name Provider Type    Riki Chappell, PT Physical Therapist            OP Exercises     Row Name 21 7203             Subjective Comments    Subjective Comments  Pt stated that she was feeling much better after her last appointment  -         Subjective Pain    Able to rate subjective pain?  yes  -HP      Pre-Treatment Pain Level  1  -HP      Post-Treatment Pain Level  0  -HP         Total Minutes    50723 - PT Therapeutic Exercise Minutes  20  -HP      66555 - PT Manual Therapy Minutes  8  -HP         Exercise 1     Exercise Name 1  Nustep  -HP      Time 1  5 min  -HP      Additional Comments  L5  -HP         Exercise 2    Exercise Name 2  LTR, bridges, SLR  -HP      Reps 2  15 each  -HP         Exercise 3    Exercise Name 3  White ball: KTC, bridges, LTR  -HP      Reps 3  15 each  -HP         Exercise 4    Exercise Name 4  Red ball: fwd and side to side roll outs  -HP      Reps 4  10 each  -HP         Exercise 5    Exercise Name 5  Prone press up with OP  -HP      Sets 5  2  -HP      Reps 5  10  -HP         Exercise 6    Exercise Name 6  Ladder stepback stretch  -HP      Reps 6  3  -HP      Time 6  20 sec  -HP        User Key  (r) = Recorded By, (t) = Taken By, (c) = Cosigned By    Initials Name Provider Type     Riki Travis, PT Physical Therapist                      Manual Rx (last 36 hours)      Manual Treatments     Row Name 08/17/21 0730             Total Minutes    27325 - PT Manual Therapy Minutes  8  -HP         Manual Rx 1    Manual Rx 1 Location  lumbar spine  -HP      Manual Rx 1 Type  soft tissue mobilization using tools to lumbar paraspinals and superior gluteals. Client was prone and moderate pressure was utilized.  -HP      Manual Rx 1 Duration  8 min  -HP        User Key  (r) = Recorded By, (t) = Taken By, (c) = Cosigned By    Initials Name Provider Type     Riki Travis, PT Physical Therapist                             Time Calculation:   Start Time: 0730  Timed Charges  82497 - PT Therapeutic Exercise Minutes: 20  10938 - PT Manual Therapy Minutes: 8  Total Minutes  Timed Charges Total Minutes: 28   Total Minutes: 28  Therapy Charges for Today     Code Description Service Date Service Provider Modifiers Qty    26778008110  PT THER PROC EA 15 MIN 8/17/2021 Riki Travis, PT GP 1    [id number]  PT MANUAL THERAPY EA 15 MIN 8/17/2021 Riki Travis, PT GP 1                    Riki Travis PT  8/17/2021

## 2021-08-19 ENCOUNTER — HOSPITAL ENCOUNTER (OUTPATIENT)
Dept: PHYSICAL THERAPY | Facility: HOSPITAL | Age: 46
Setting detail: THERAPIES SERIES
Discharge: HOME | End: 2021-08-19

## 2021-08-19 DIAGNOSIS — M43.06 LUMBAR SPONDYLOLYSIS: Primary | ICD-10-CM

## 2021-08-19 PROCEDURE — 97110 THERAPEUTIC EXERCISES: CPT | Performed by: GENERAL ACUTE CARE HOSPITAL

## 2021-08-19 PROCEDURE — 97012 MECHANICAL TRACTION THERAPY: CPT | Performed by: GENERAL ACUTE CARE HOSPITAL

## 2021-08-19 NOTE — THERAPY TREATMENT NOTE
Outpatient Physical Therapy Ortho Treatment Note  [facility]     Patient Name: Valery Soriano  : 1975  MRN: [id number]  Today's Date: 2021      Visit Date: 2021    Visit Dx:    ICD-10-CM ICD-9-CM   1. Lumbar spondylolysis  M43.06 738.4       Patient Active Problem List   Diagnosis   • Family history of breast cancer   • IUD (intrauterine device) in place        Past Medical History:   Diagnosis Date   • Anxiety 2015    celexa   • Fibrocystic breast disease    • IUD (intrauterine device) in place     Mirena   • Obesity     somewhat overweight   • Ovarian cyst    • Screening breast examination     self admits   • Visual impairment     contacts        Past Surgical History:   Procedure Laterality Date   • AUGMENTATION MAMMAPLASTY Bilateral     silicone   • BREAST SURGERY     •  SECTION     • CHOLECYSTECTOMY  2011    laparoscopic   • COSMETIC SURGERY  2013   • GALLBLADDER SURGERY     • INTRAUTERINE DEVICE INSERTION     • OTHER SURGICAL HISTORY      IUD removal                       PT Assessment/Plan     Row Name 21 1130          PT Assessment    Assessment Comments  Trialed manual traction with pt having significant relief. Attempted mechanical traction with pt having improvement in her symptoms and pain.   -HP        PT Plan    PT Plan Comments  Continue per POC  -HP       User Key  (r) = Recorded By, (t) = Taken By, (c) = Cosigned By    Initials Name Provider Type    Riki Chappell, SHIRA Physical Therapist          Modalities     Row Name 21 6586             Traction 79651    Traction Type  Lumbar  -HP      PT Traction Rx Minutes  20  -HP      Duration  Static  -HP      Position  Hook-lying  -HP      Weight  65  -HP      Hold  60  -HP      Relax  10  -HP        User Key  (r) = Recorded By, (t) = Taken By, (c) = Cosigned By    Initials Name Provider Type    Riki Chappell, PT Physical Therapist        OP Exercises     Row Name 21 3049              Subjective Comments    Subjective Comments  Pt stated that she was feeling more sore today than normal  -HP         Subjective Pain    Able to rate subjective pain?  yes  -HP      Pre-Treatment Pain Level  3  -HP      Post-Treatment Pain Level  0  -HP         Total Minutes    69238 - PT Therapeutic Exercise Minutes  8  -HP         Exercise 1    Exercise Name 1  Nustep  -HP      Time 1  8 min  -HP      Additional Comments  L5  -HP        User Key  (r) = Recorded By, (t) = Taken By, (c) = Cosigned By    Initials Name Provider Type     Riki Travis, PT Physical Therapist                                          Time Calculation:   Start Time: 0730  Timed Charges  93411 - PT Therapeutic Exercise Minutes: 8  Untimed Charges  PT Traction Rx Minutes: 20  Total Minutes  Timed Charges Total Minutes: 8  Untimed Charges Total Minutes: 20   Total Minutes: 28  Therapy Charges for Today     Code Description Service Date Service Provider Modifiers Qty    [id number]  PT THER PROC EA 15 MIN 8/19/2021 Riki Travis, PT GP 1    [id number]  PT TRACTION LUMBAR 8/19/2021 Riki Travis, PT GP 1                    Riki Travis PT  8/19/2021

## 2021-08-24 ENCOUNTER — HOSPITAL ENCOUNTER (OUTPATIENT)
Dept: PHYSICAL THERAPY | Facility: HOSPITAL | Age: 46
Setting detail: THERAPIES SERIES
Discharge: HOME | End: 2021-08-24

## 2021-08-24 DIAGNOSIS — M43.06 LUMBAR SPONDYLOLYSIS: Primary | ICD-10-CM

## 2021-08-24 PROCEDURE — 97110 THERAPEUTIC EXERCISES: CPT | Performed by: GENERAL ACUTE CARE HOSPITAL

## 2021-08-24 PROCEDURE — 97012 MECHANICAL TRACTION THERAPY: CPT | Performed by: GENERAL ACUTE CARE HOSPITAL

## 2021-08-24 NOTE — THERAPY TREATMENT NOTE
Outpatient Physical Therapy Ortho Treatment Note  [facility]     Patient Name: Valery Soriano  : 1975  MRN: [id number]  Today's Date: 2021      Visit Date: 2021    Visit Dx:    ICD-10-CM ICD-9-CM   1. Lumbar spondylolysis  M43.06 738.4       Patient Active Problem List   Diagnosis   • Family history of breast cancer   • IUD (intrauterine device) in place        Past Medical History:   Diagnosis Date   • Anxiety 2015    celexa   • Fibrocystic breast disease    • IUD (intrauterine device) in place     Mirena   • Obesity     somewhat overweight   • Ovarian cyst    • Screening breast examination     self admits   • Visual impairment     contacts        Past Surgical History:   Procedure Laterality Date   • AUGMENTATION MAMMAPLASTY Bilateral     silicone   • BREAST SURGERY     •  SECTION     • CHOLECYSTECTOMY  2011    laparoscopic   • COSMETIC SURGERY  2013   • GALLBLADDER SURGERY     • INTRAUTERINE DEVICE INSERTION     • OTHER SURGICAL HISTORY      IUD removal                       PT Assessment/Plan     Row Name 21 1655          PT Assessment    Assessment Comments  With use of traction, pt is having significant improvement in her symptoms. She is able to tolerate increased exercises with decreased pain noted throughout.  -HP        PT Plan    PT Plan Comments  Continue per POC and determine if pt is ready for d/c at next visit  -HP       User Key  (r) = Recorded By, (t) = Taken By, (c) = Cosigned By    Initials Name Provider Type    Riki Chappell, SHIRA Physical Therapist          Modalities     Row Name 21 1655             Traction 63257    Traction Type  Lumbar  -HP      PT Traction Rx Minutes  20  -HP      Duration  Static  -HP      Position  Hook-lying  -HP      Weight  65  -HP      Hold  60  -HP      Relax  10  -HP        User Key  (r) = Recorded By, (t) = Taken By, (c) = Cosigned By    Initials Name Provider Type    Riki Chappell, SHIRA Physical Therapist         OP Exercises     Row Name 08/24/21 1655             Subjective Comments    Subjective Comments  Pt stated that she has been doing very well since her last appointment  -HP         Subjective Pain    Able to rate subjective pain?  yes  -HP      Pre-Treatment Pain Level  2  -HP      Post-Treatment Pain Level  0  -HP         Total Minutes    48038 - PT Therapeutic Exercise Minutes  25  -HP         Exercise 1    Exercise Name 1  STS with ext with 4# ball  -HP      Reps 1  10  -HP         Exercise 2    Exercise Name 2  Seated 4# ball trunk rotations and diagonals  -HP      Reps 2  10 each  -HP         Exercise 3    Exercise Name 3  Blue ball fwd and side to side  -HP      Reps 3  10 each  -HP         Exercise 4    Exercise Name 4  Cat camels  -HP      Reps 4  10  -HP         Exercise 5    Exercise Name 5  Bird dogs  -HP      Reps 5  10  -HP         Exercise 6    Exercise Name 6  Paramjit pose  -HP      Reps 6  2  -HP      Time 6  30 sec  -HP        User Key  (r) = Recorded By, (t) = Taken By, (c) = Cosigned By    Initials Name Provider Type     Riki Travis, PT Physical Therapist                                          Time Calculation:   Start Time: 1655  Timed Charges  43377 - PT Therapeutic Exercise Minutes: 25  Untimed Charges  PT Traction Rx Minutes: 20  Total Minutes  Timed Charges Total Minutes: 25  Untimed Charges Total Minutes: 20   Total Minutes: 45  Therapy Charges for Today     Code Description Service Date Service Provider Modifiers Qty    03228684175  PT THER PROC EA 15 MIN 8/24/2021 Riki Travis, PT GP 2    [id number]  PT TRACTION LUMBAR 8/24/2021 Riki Travis, PT GP 1                    Riki Travis PT  8/24/2021

## 2021-08-26 ENCOUNTER — HOSPITAL ENCOUNTER (OUTPATIENT)
Dept: PHYSICAL THERAPY | Facility: HOSPITAL | Age: 46
Setting detail: THERAPIES SERIES
Discharge: HOME | End: 2021-08-26

## 2021-08-26 DIAGNOSIS — M43.06 LUMBAR SPONDYLOLYSIS: Primary | ICD-10-CM

## 2021-08-26 PROCEDURE — 97012 MECHANICAL TRACTION THERAPY: CPT | Performed by: GENERAL ACUTE CARE HOSPITAL

## 2021-08-26 PROCEDURE — 97110 THERAPEUTIC EXERCISES: CPT | Performed by: GENERAL ACUTE CARE HOSPITAL

## 2021-08-26 NOTE — THERAPY DISCHARGE NOTE
Outpatient Physical Therapy Ortho Treatment Note/Discharge Summary   [place]     Patient Name: Valery Soriano  : 1975  MRN: [id number]  Today's Date: 2021      Visit Date: 2021    Visit Dx:    ICD-10-CM ICD-9-CM   1. Lumbar spondylolysis  M43.06 738.4       Patient Active Problem List   Diagnosis   • Family history of breast cancer   • IUD (intrauterine device) in place        Past Medical History:   Diagnosis Date   • Anxiety 2015    celexa   • Fibrocystic breast disease    • IUD (intrauterine device) in place     Mirena   • Obesity     somewhat overweight   • Ovarian cyst    • Screening breast examination     self admits   • Visual impairment     contacts        Past Surgical History:   Procedure Laterality Date   • AUGMENTATION MAMMAPLASTY Bilateral     silicone   • BREAST SURGERY     •  SECTION     • CHOLECYSTECTOMY  2011    laparoscopic   • COSMETIC SURGERY  2013   • GALLBLADDER SURGERY     • INTRAUTERINE DEVICE INSERTION     • OTHER SURGICAL HISTORY      IUD removal                       PT Assessment/Plan     Row Name 21 1640          PT Assessment    Assessment Comments  Pt arrived today without having any pain in her low back. At her initial evaluation, she was not able to complete repeated trunk extensions without pain. Today, she performed 10 repeated trunk extensions without any complaints of pain. She did very well with her course of care.  -HP     Please refer to paper survey for additional self-reported information  Yes  -HP     Rehab Potential  Excellent  -HP     Patient/caregiver participated in establishment of treatment plan and goals  Yes  -HP     Patient would benefit from skilled therapy intervention  No  -HP        PT Plan    PT Plan Comments  Pt to be d/c home due to significant improvement in her symptoms and improved functional mobility without pain.  -HP       User Key  (r) = Recorded By, (t) = Taken By, (c) = Cosigned By    Initials Name  Provider Type     Riki Travis, PT Physical Therapist          Modalities     Row Name 08/26/21 1640             Traction 60088    Traction Type  Lumbar  -HP      PT Traction Rx Minutes  20  -HP      Duration  Static  -HP      Position  Hook-lying  -HP      Weight  65  -HP      Hold  60  -HP      Relax  20  -HP        User Key  (r) = Recorded By, (t) = Taken By, (c) = Cosigned By    Initials Name Provider Type     Riki Travis, PT Physical Therapist          OP Exercises     Row Name 08/26/21 1640             Subjective Comments    Subjective Comments  Pt stated that her back was feeling pretty good  -HP         Subjective Pain    Able to rate subjective pain?  yes  -HP      Pre-Treatment Pain Level  0  -HP      Post-Treatment Pain Level  0  -HP         Total Minutes    13156 - PT Therapeutic Exercise Minutes  25  -HP         Exercise 1    Exercise Name 1  Nustep  -HP      Time 1  5 min   -HP      Additional Comments  L5  -HP         Exercise 2    Exercise Name 2  Time to update objective measure and POC  -HP      Time 2  5 min  -HP         Exercise 3    Exercise Name 3  White ball: LTR, bridges  -HP      Reps 3  10 each  -HP         Exercise 4    Exercise Name 4  Trunk mobility: flexions, extensions, and rotations  -HP      Reps 4  10 each direction   -HP        User Key  (r) = Recorded By, (t) = Taken By, (c) = Cosigned By    Initials Name Provider Type     Riki Travis, PT Physical Therapist                         PT OP Goals     Row Name 08/26/21 1640          PT Short Term Goals    STG Date to Achieve  08/25/21  -HP     STG 1  Patient to report improvement of symptoms by 50% or greater.  -HP     STG 1 Progress  Met  -HP     STG 2  Patient report adherence to HEP.  -HP     STG 2 Progress  Met  -HP     STG 3  Patient to report no tenderness to palpation along the lumbar paraspinal/QL.  -HP     STG 3 Progress  Met  -HP        Long Term Goals    LTG Date to Achieve  08/25/21  -HP     LTG 1  Patient to  report improvement of symptoms by 75% or greater.  -HP     LTG 1 Progress  Met  -HP     LTG 1 Progress Comments   At least 90% she stated  -HP     LTG 2  Patient reports independence with HEP.  -HP     LTG 2 Progress  Met  -HP     LTG 3  Patient report no pain with standing or trunk extension in the lower back.  -HP     LTG 3 Progress  Met  -HP        Time Calculation    PT Goal Re-Cert Due Date  11/02/21  -       User Key  (r) = Recorded By, (t) = Taken By, (c) = Cosigned By    Initials Name Provider Type    HP Riki Travis, PT Physical Therapist               Outcome Measure Options: Modifed Owestry  Modified Oswestry  Modified Oswestry Score/Comments: 2% (1/50)      Time Calculation:   Start Time: 1640  Timed Charges  78878 - PT Therapeutic Exercise Minutes: 25  Untimed Charges  PT Traction Rx Minutes: 20  Total Minutes  Timed Charges Total Minutes: 25  Untimed Charges Total Minutes: 20   Total Minutes: 45  Therapy Charges for Today     Code Description Service Date Service Provider Modifiers Qty    47633337487 HC PT THER PROC EA 15 MIN 8/26/2021 Riki Travis, PT GP 2    92799857254 HC PT TRACTION LUMBAR 8/26/2021 Riki Travis, PT GP 1          PT G-Codes  Outcome Measure Options: Modifed Owestry  Modified Oswestry Score/Comments: 2% (1/50)     OP PT Discharge Summary  Date of Discharge: 08/26/21  Reason for Discharge: All goals achieved, Independent  Outcomes Achieved: Able to achieve all goals within established timeline  Discharge Destination: Home with home program  Discharge Instructions/Additional Comments: Pt to be d/c home as she was able to achieve each of her goals and does not have any complaints of pain in the low back. She did very well with therapy and her prognosis at d/c is good.      Riki Travis, PT  8/26/2021

## 2021-10-08 ENCOUNTER — IMMUNIZATION (OUTPATIENT)
Dept: VACCINE CLINIC | Facility: HOSPITAL | Age: 46
End: 2021-10-08

## 2021-10-08 PROCEDURE — 0003A: CPT | Performed by: INTERNAL MEDICINE

## 2021-10-08 PROCEDURE — 91300 HC SARSCOV02 VAC 30MCG/0.3ML IM: CPT | Performed by: INTERNAL MEDICINE

## 2021-10-08 PROCEDURE — 0004A ADM SARSCOV2 30MCG/0.3ML BOOSTER: CPT | Performed by: INTERNAL MEDICINE

## 2021-11-12 ENCOUNTER — OFFICE VISIT (OUTPATIENT)
Dept: FAMILY MEDICINE CLINIC | Facility: CLINIC | Age: 46
End: 2021-11-12

## 2021-11-12 VITALS
SYSTOLIC BLOOD PRESSURE: 116 MMHG | RESPIRATION RATE: 14 BRPM | BODY MASS INDEX: 31.62 KG/M2 | DIASTOLIC BLOOD PRESSURE: 68 MMHG | HEIGHT: 65 IN | WEIGHT: 189.8 LBS | OXYGEN SATURATION: 99 % | TEMPERATURE: 97.1 F | HEART RATE: 89 BPM

## 2021-11-12 DIAGNOSIS — E03.9 HYPOTHYROIDISM, UNSPECIFIED TYPE: ICD-10-CM

## 2021-11-12 DIAGNOSIS — Z00.00 ROUTINE GENERAL MEDICAL EXAMINATION AT A HEALTH CARE FACILITY: Primary | ICD-10-CM

## 2021-11-12 PROCEDURE — 99396 PREV VISIT EST AGE 40-64: CPT | Performed by: PHYSICIAN ASSISTANT

## 2021-11-12 PROCEDURE — 93000 ELECTROCARDIOGRAM COMPLETE: CPT | Performed by: PHYSICIAN ASSISTANT

## 2021-11-12 RX ORDER — LEVOTHYROXINE SODIUM 0.03 MG/1
25 TABLET ORAL DAILY
Qty: 30 TABLET | Refills: 11 | Status: SHIPPED | OUTPATIENT
Start: 2021-11-12 | End: 2022-01-05 | Stop reason: SDUPTHER

## 2021-11-12 NOTE — PROGRESS NOTES
Subjective   Valery Soriano is a 46 y.o. female  Annual Exam (Pap, MMG scheduled. Not fasting. RF levothyroxine. )      History of Present Illness  Presents for preventive medical examination new problems or complaints, doing well needs refill of levothyroxine patient is scheduled for mammogram and Pap  The following portions of the patient's history were reviewed and updated as appropriate: allergies, current medications, past social history and problem list    Review of Systems   Constitutional: Negative.    HENT: Negative.    Eyes: Negative.    Respiratory: Negative.    Cardiovascular: Negative.    Gastrointestinal: Negative.    Endocrine: Negative.    Genitourinary: Negative.    Musculoskeletal: Negative.    Skin: Negative.    Allergic/Immunologic: Negative.    Neurological: Negative.    Hematological: Negative.    Psychiatric/Behavioral: Negative.    All other systems reviewed and are negative.      Objective     Vitals:    11/12/21 1333   BP: 116/68   Pulse: 89   Resp: 14   Temp: 97.1 °F (36.2 °C)   SpO2: 99%       Physical Exam  Vitals and nursing note reviewed.   Constitutional:       Appearance: She is well-developed.   HENT:      Head: Normocephalic and atraumatic.      Right Ear: External ear normal.      Left Ear: External ear normal.      Nose: Nose normal.   Eyes:      Conjunctiva/sclera: Conjunctivae normal.      Pupils: Pupils are equal, round, and reactive to light.   Neck:      Thyroid: No thyromegaly.      Vascular: No carotid bruit or JVD.   Cardiovascular:      Rate and Rhythm: Normal rate and regular rhythm.      Heart sounds: Normal heart sounds. No murmur heard.      Pulmonary:      Effort: Pulmonary effort is normal.      Breath sounds: Normal breath sounds.   Abdominal:      General: Bowel sounds are normal.      Palpations: Abdomen is soft. There is no mass.      Tenderness: There is no abdominal tenderness.   Musculoskeletal:         General: Normal range of motion.      Cervical back:  Normal range of motion and neck supple.   Lymphadenopathy:      Cervical: No cervical adenopathy.   Skin:     General: Skin is warm and dry.   Neurological:      Mental Status: She is alert and oriented to person, place, and time.      Cranial Nerves: No cranial nerve deficit.      Deep Tendon Reflexes: Reflexes are normal and symmetric.       ECG 12 Lead    Date/Time: 11/12/2021 1:52 PM  Performed by: Giovany Sampson PA  Authorized by: Giovany Sampson PA   Comparison: compared with previous ECG from 10/30/2020  Similar to previous ECG  Rhythm: sinus rhythm  ST Segments: ST segments normal  T Waves: T waves normal    Clinical impression: normal ECG and non-specific ECG            Assessment/Plan     Diagnoses and all orders for this visit:    1. Routine general medical examination at a health care facility (Primary)  -     Comprehensive Metabolic Panel; Future  -     CBC & Differential; Future  -     Lipid Panel; Future  -     Vitamin D 25 hydroxy; Future  -     TSH; Future    2. Hypothyroidism, unspecified type  -     levothyroxine (SYNTHROID, LEVOTHROID) 25 MCG tablet; Take 1 tablet by mouth Daily.  Dispense: 30 tablet; Refill: 11    Other orders  -     ECG 12 Lead     I spent 15 minutes in patient care: Reviewing records prior to the visit, examining the patient, entering orders and documentation    Preventive medicine discussed, diet, exercise, healthy living discussed at length.  Discussed nutrition, physical activity, healthy weight, injury prevention, missed use of tobacco, alcohol and drugs, dental health, mental health, immunizations, screening    Part of this note may be an electronic transcription/translation of spoken language to printed text using the Dragon Dictation System.

## 2021-12-20 ENCOUNTER — LAB (OUTPATIENT)
Dept: LAB | Facility: HOSPITAL | Age: 46
End: 2021-12-20

## 2021-12-20 DIAGNOSIS — Z00.00 ROUTINE GENERAL MEDICAL EXAMINATION AT A HEALTH CARE FACILITY: ICD-10-CM

## 2021-12-20 LAB
BASOPHILS # BLD AUTO: 0.02 10*3/MM3 (ref 0–0.2)
BASOPHILS NFR BLD AUTO: 0.2 % (ref 0–1.5)
DEPRECATED RDW RBC AUTO: 42.2 FL (ref 37–54)
EOSINOPHIL # BLD AUTO: 0.26 10*3/MM3 (ref 0–0.4)
EOSINOPHIL NFR BLD AUTO: 3 % (ref 0.3–6.2)
ERYTHROCYTE [DISTWIDTH] IN BLOOD BY AUTOMATED COUNT: 12.9 % (ref 12.3–15.4)
HCT VFR BLD AUTO: 42.1 % (ref 34–46.6)
HGB BLD-MCNC: 14.2 G/DL (ref 12–15.9)
IMM GRANULOCYTES # BLD AUTO: 0.03 10*3/MM3 (ref 0–0.05)
IMM GRANULOCYTES NFR BLD AUTO: 0.3 % (ref 0–0.5)
LYMPHOCYTES # BLD AUTO: 1.34 10*3/MM3 (ref 0.7–3.1)
LYMPHOCYTES NFR BLD AUTO: 15.6 % (ref 19.6–45.3)
MCH RBC QN AUTO: 30.7 PG (ref 26.6–33)
MCHC RBC AUTO-ENTMCNC: 33.7 G/DL (ref 31.5–35.7)
MCV RBC AUTO: 91.1 FL (ref 79–97)
MONOCYTES # BLD AUTO: 0.64 10*3/MM3 (ref 0.1–0.9)
MONOCYTES NFR BLD AUTO: 7.5 % (ref 5–12)
NEUTROPHILS NFR BLD AUTO: 6.3 10*3/MM3 (ref 1.7–7)
NEUTROPHILS NFR BLD AUTO: 73.4 % (ref 42.7–76)
NRBC BLD AUTO-RTO: 0 /100 WBC (ref 0–0.2)
PLATELET # BLD AUTO: 381 10*3/MM3 (ref 140–450)
PMV BLD AUTO: 11.2 FL (ref 6–12)
RBC # BLD AUTO: 4.62 10*6/MM3 (ref 3.77–5.28)
WBC NRBC COR # BLD: 8.59 10*3/MM3 (ref 3.4–10.8)

## 2021-12-20 PROCEDURE — 85025 COMPLETE CBC W/AUTO DIFF WBC: CPT

## 2021-12-20 PROCEDURE — 36415 COLL VENOUS BLD VENIPUNCTURE: CPT

## 2021-12-20 PROCEDURE — 82306 VITAMIN D 25 HYDROXY: CPT

## 2021-12-20 PROCEDURE — 84443 ASSAY THYROID STIM HORMONE: CPT

## 2021-12-20 PROCEDURE — 80053 COMPREHEN METABOLIC PANEL: CPT

## 2021-12-20 PROCEDURE — 80061 LIPID PANEL: CPT

## 2021-12-21 ENCOUNTER — APPOINTMENT (OUTPATIENT)
Dept: MAMMOGRAPHY | Facility: HOSPITAL | Age: 46
End: 2021-12-21

## 2021-12-21 LAB
25(OH)D3 SERPL-MCNC: 29.5 NG/ML
ALBUMIN SERPL-MCNC: 4.3 G/DL (ref 3.5–5.2)
ALBUMIN/GLOB SERPL: 1.8 G/DL
ALP SERPL-CCNC: 76 U/L (ref 39–117)
ALT SERPL W P-5'-P-CCNC: 12 U/L (ref 1–33)
ANION GAP SERPL CALCULATED.3IONS-SCNC: 12.1 MMOL/L (ref 5–15)
AST SERPL-CCNC: 18 U/L (ref 1–32)
BILIRUB SERPL-MCNC: 0.5 MG/DL (ref 0–1.2)
BUN SERPL-MCNC: 9 MG/DL (ref 6–20)
BUN/CREAT SERPL: 11.5 (ref 7–25)
CALCIUM SPEC-SCNC: 9.7 MG/DL (ref 8.6–10.5)
CHLORIDE SERPL-SCNC: 102 MMOL/L (ref 98–107)
CHOLEST SERPL-MCNC: 175 MG/DL (ref 0–200)
CO2 SERPL-SCNC: 25.9 MMOL/L (ref 22–29)
CREAT SERPL-MCNC: 0.78 MG/DL (ref 0.57–1)
GFR SERPL CREATININE-BSD FRML MDRD: 80 ML/MIN/1.73
GLOBULIN UR ELPH-MCNC: 2.4 GM/DL
GLUCOSE SERPL-MCNC: 72 MG/DL (ref 65–99)
HDLC SERPL-MCNC: 74 MG/DL (ref 40–60)
LDLC SERPL CALC-MCNC: 89 MG/DL (ref 0–100)
LDLC/HDLC SERPL: 1.2 {RATIO}
POTASSIUM SERPL-SCNC: 4.4 MMOL/L (ref 3.5–5.2)
PROT SERPL-MCNC: 6.7 G/DL (ref 6–8.5)
SODIUM SERPL-SCNC: 140 MMOL/L (ref 136–145)
TRIGL SERPL-MCNC: 61 MG/DL (ref 0–150)
TSH SERPL DL<=0.05 MIU/L-ACNC: 3.13 UIU/ML (ref 0.27–4.2)
VLDLC SERPL-MCNC: 12 MG/DL (ref 5–40)

## 2021-12-22 ENCOUNTER — HOSPITAL ENCOUNTER (OUTPATIENT)
Dept: MAMMOGRAPHY | Facility: HOSPITAL | Age: 46
Discharge: HOME | End: 2021-12-22
Admitting: FAMILY MEDICINE

## 2021-12-22 ENCOUNTER — OFFICE VISIT (OUTPATIENT)
Dept: OBSTETRICS AND GYNECOLOGY | Facility: CLINIC | Age: 46
End: 2021-12-22

## 2021-12-22 VITALS — DIASTOLIC BLOOD PRESSURE: 80 MMHG | WEIGHT: 193.6 LBS | BODY MASS INDEX: 32.22 KG/M2 | SYSTOLIC BLOOD PRESSURE: 112 MMHG

## 2021-12-22 DIAGNOSIS — Z12.11 SCREENING FOR COLON CANCER: ICD-10-CM

## 2021-12-22 DIAGNOSIS — Z30.431 IUD CHECK UP: ICD-10-CM

## 2021-12-22 DIAGNOSIS — Z00.00 ENCOUNTER FOR ANNUAL PHYSICAL EXAM: ICD-10-CM

## 2021-12-22 DIAGNOSIS — Z01.419 WOMEN'S ANNUAL ROUTINE GYNECOLOGICAL EXAMINATION: Primary | ICD-10-CM

## 2021-12-22 PROCEDURE — 77067 SCR MAMMO BI INCL CAD: CPT | Performed by: RADIOLOGY

## 2021-12-22 PROCEDURE — 77063 BREAST TOMOSYNTHESIS BI: CPT | Performed by: RADIOLOGY

## 2021-12-22 PROCEDURE — 77063 BREAST TOMOSYNTHESIS BI: CPT

## 2021-12-22 PROCEDURE — 77067 SCR MAMMO BI INCL CAD: CPT

## 2021-12-22 PROCEDURE — 99396 PREV VISIT EST AGE 40-64: CPT | Performed by: NURSE PRACTITIONER

## 2021-12-22 NOTE — PROGRESS NOTES
GYN Annual Exam     CC - Here for annual exam.        HPI  Valery Soriano is a 46 y.o. female, , who presents for annual well woman exam.  Periods are absent, secondary to Mirena.  Dysmenorrhea:none.  Patient reports problems with: none. There were no changes to her medical or surgical history since her last visit.. Partner Status: Marital Status: .  New Partners since last visit: no.  Desires STD Screening: no.    Additional OB/GYN History   Current contraception: contraceptive methods: IUD.  Insertion date: 2019- Mirena  Desires to: continue contraception  Last Pap : 10/5/2020- Pap w/ reflex- negative  Last Completed Pap Smear     This patient has no relevant Health Maintenance data.        History of abnormal Pap smear: no  Family history of uterine, colon, breast, or ovarian cancer: yes - Maternal Aunt- Breast and Ovarian  Performs monthly Self-Breast Exam: yes  Last mammogram: 2021. Done at [facility].    Last Completed Mammogram     This patient has no relevant Health Maintenance data.         Exercises Regularly: No  Feelings of Anxiety or Depression: no  Tobacco Usage?: No   OB History        2    Para   2    Term   2            AB        Living           SAB        IAB        Ectopic        Molar        Multiple        Live Births                    Health Maintenance   Topic Date Due   • Annual Gynecologic Pelvic and Breast Exam  10/06/2021   • MAMMOGRAM  2021   • PAP SMEAR  2022 (Originally 10/15/2021)   • ANNUAL PHYSICAL  2022   • TDAP/TD VACCINES (2 - Td or Tdap) 2027   • HEPATITIS C SCREENING  Completed   • COVID-19 Vaccine  Completed   • INFLUENZA VACCINE  Completed   • Pneumococcal Vaccine 0-64  Aged Out       The additional following portions of the patient's history were reviewed and updated as appropriate: allergies, current medications, past family history, past medical history, past social history, past surgical history and problem  list.    Review of Systems   Constitutional: Negative.    Respiratory: Negative.    Cardiovascular: Negative.    Gastrointestinal: Negative.    Genitourinary: Negative.    Neurological: Negative.    Psychiatric/Behavioral: Negative.          I have reviewed and agree with the HPI, ROS, and historical information as entered above. Arleth Avery, APRN    Objective   /80   Wt 87.8 kg (193 lb 9.6 oz)   LMP  (LMP Unknown)   Breastfeeding No   BMI 32.22 kg/m²     Physical Exam  Vitals and nursing note reviewed. Exam conducted with a chaperone present.   Constitutional:       Appearance: She is well-developed.   HENT:      Head: Normocephalic and atraumatic.   Neck:      Thyroid: No thyroid mass or thyromegaly.   Cardiovascular:      Rate and Rhythm: Normal rate and regular rhythm.      Heart sounds: No murmur heard.      Pulmonary:      Effort: Pulmonary effort is normal. No retractions.      Breath sounds: Normal breath sounds. No wheezing, rhonchi or rales.   Chest:      Chest wall: No mass or tenderness.   Breasts:      Right: Normal. No mass, nipple discharge, skin change or tenderness.      Left: Normal. No mass, nipple discharge, skin change or tenderness.        Comments: Bilateral breast implants present  Abdominal:      Palpations: Abdomen is soft. Abdomen is not rigid. There is no mass.      Tenderness: There is no abdominal tenderness. There is no guarding.      Hernia: No hernia is present. There is no hernia in the left inguinal area.   Genitourinary:     Labia:         Right: No rash, tenderness or lesion.         Left: No rash, tenderness or lesion.       Vagina: Normal. No vaginal discharge or lesions.      Cervix: No cervical motion tenderness, discharge, lesion or cervical bleeding.      Uterus: Normal. Not enlarged, not fixed and not tender.       Adnexa:         Right: No mass or tenderness.          Left: No mass or tenderness.        Rectum: No external hemorrhoid.      Comments: IUD  strings visible and appropriate   Musculoskeletal:      Cervical back: Normal range of motion. No muscular tenderness.   Neurological:      Mental Status: She is alert and oriented to person, place, and time.   Psychiatric:         Behavior: Behavior normal.            Assessment and Plan    Problem List Items Addressed This Visit     None      Visit Diagnoses     Women's annual routine gynecological examination    -  Primary    Relevant Orders    Pap IG, HPV-hr    Screening for colon cancer        Relevant Orders    Ambulatory Referral For Screening Colonoscopy    IUD check up              1. GYN annual well woman exam.   2. Reviewed monthly self breast exams.  Instructed to call with lumps, pain, or breast discharge.    3. RTC in 1 year or PRN with problems.  4. Return in about 1 year (around 12/22/2022) for Annual physical.     Arleth Avery, APRN  12/22/2021

## 2022-01-04 DIAGNOSIS — Z01.419 WOMEN'S ANNUAL ROUTINE GYNECOLOGICAL EXAMINATION: ICD-10-CM

## 2022-01-05 DIAGNOSIS — E03.9 HYPOTHYROIDISM, UNSPECIFIED TYPE: ICD-10-CM

## 2022-01-07 RX ORDER — LEVOTHYROXINE SODIUM 0.03 MG/1
25 TABLET ORAL DAILY
Qty: 30 TABLET | Refills: 11 | Status: SHIPPED | OUTPATIENT
Start: 2022-01-07

## 2022-02-16 DIAGNOSIS — R63.5 WEIGHT GAIN: ICD-10-CM

## 2022-02-17 RX ORDER — PHENTERMINE HYDROCHLORIDE 37.5 MG/1
37.5 CAPSULE ORAL EVERY MORNING
Qty: 30 CAPSULE | Refills: 0 | Status: SHIPPED | OUTPATIENT
Start: 2022-02-17 | End: 2022-04-05 | Stop reason: SDUPTHER

## 2022-04-05 DIAGNOSIS — Z00.00 ROUTINE GENERAL MEDICAL EXAMINATION AT A HEALTH CARE FACILITY: ICD-10-CM

## 2022-04-05 DIAGNOSIS — R63.5 WEIGHT GAIN: ICD-10-CM

## 2022-04-07 RX ORDER — CITALOPRAM 20 MG/1
20 TABLET ORAL DAILY
Qty: 90 TABLET | Refills: 3 | Status: SHIPPED | OUTPATIENT
Start: 2022-04-07

## 2022-04-07 RX ORDER — PHENTERMINE HYDROCHLORIDE 37.5 MG/1
37.5 CAPSULE ORAL EVERY MORNING
Qty: 30 CAPSULE | Refills: 0 | Status: SHIPPED | OUTPATIENT
Start: 2022-04-07 | End: 2022-07-07 | Stop reason: SDUPTHER

## 2022-04-08 ENCOUNTER — OFFICE VISIT (OUTPATIENT)
Dept: OBSTETRICS AND GYNECOLOGY | Facility: CLINIC | Age: 47
End: 2022-04-08

## 2022-04-08 VITALS
SYSTOLIC BLOOD PRESSURE: 120 MMHG | HEIGHT: 65 IN | BODY MASS INDEX: 32.09 KG/M2 | WEIGHT: 192.6 LBS | DIASTOLIC BLOOD PRESSURE: 70 MMHG

## 2022-04-08 DIAGNOSIS — N64.4 BREAST PAIN, LEFT: ICD-10-CM

## 2022-04-08 DIAGNOSIS — N63.25 BREAST LUMP ON LEFT SIDE AT 6 O'CLOCK POSITION: Primary | ICD-10-CM

## 2022-04-08 PROCEDURE — 99213 OFFICE O/P EST LOW 20 MIN: CPT | Performed by: NURSE PRACTITIONER

## 2022-04-08 NOTE — PROGRESS NOTES
Chief Complaint   Patient presents with   • lump in breast       Subjective     HPI  Valery Sroiano is a 46 y.o. female, , who presents with breast complaints of breast lump and breast tenderness.  This is present in left breast(s).    She states she has experienced this problem for 4 days.  She describes the severity as painful to the touch, or the breast .  She states that the problem has not changed since she discovered it. The patient denies breast tenderness, changed mole, dryness, nipple discharge, pruritus, rash, skin color change and skin lesion(s). She has had a mammogram before. She does not have a family history of breast cancer. patient reports distant family members have been diagnosed with Breast cancer, but no one in her immediate family.     Her last LMP was No LMP recorded. Patient has had an implant..  Periods are rare.    Current contraception: contraceptive methods: IUD.  Insertion date: 2019    Last mammogram:   Last Completed Mammogram          Ordered - MAMMOGRAM (Yearly) Ordered on 2021  Mammo Screening Digital Tomosynthesis Bilateral With CAD    2020  Mammo Screening Digital Tomosynthesis Bilateral With CAD    2019  Mammo Screening Digital Tomosynthesis Bilateral With CAD    2018  Mammo Screening Digital Tomosynthesis Bilateral With CAD    2017  Mammo screening digital tomosynthesis bilateral w CAD              Tobacco Usage?: No     OB History        2    Para   2    Term   2            AB        Living           SAB        IAB        Ectopic        Molar        Multiple        Live Births                    Past Medical History:   Diagnosis Date   • Anxiety 2015    celexa   • Fibrocystic breast disease    • IUD (intrauterine device) in place     Mirena   • Obesity     somewhat overweight   • Ovarian cyst    • Screening breast examination     self admits   • Visual impairment     contacts       Past Surgical History:  "  Procedure Laterality Date   • AUGMENTATION MAMMAPLASTY Bilateral     silicone   • BREAST SURGERY     •  SECTION     • CHOLECYSTECTOMY  2011    laparoscopic   • COSMETIC SURGERY  2013   • GALLBLADDER SURGERY     • INTRAUTERINE DEVICE INSERTION     • OTHER SURGICAL HISTORY      IUD removal       Family History   Problem Relation Age of Onset   • No Known Problems Mother    • Heart disease Father    • Hypertension Father    • No Known Problems Brother    • Diabetes Paternal Grandfather    • Diabetes Paternal Grandmother    • Alzheimer's disease Paternal Grandmother    • Diabetes Maternal Grandmother    • Breast cancer Maternal Aunt    • Colon cancer Maternal Aunt    • Melanoma Maternal Aunt           Review of Systems   Constitutional: Negative.    Genitourinary: Positive for breast lump and breast pain.       I have reviewed and agree with the HPI, ROS, and historical information as entered above. Arleth Avery, APRN    Objective   /70   Ht 165.1 cm (65\")   Wt 87.4 kg (192 lb 9.6 oz)   BMI 32.05 kg/m²     Physical Exam  Constitutional:       Appearance: Normal appearance.   Pulmonary:      Effort: Pulmonary effort is normal.   Chest:   Breasts:      Right: Normal.      Left: Mass and tenderness present. No nipple discharge or skin change.        Comments: Bilateral implants present. Pinpoint breast tenderness at 6 oclock just below areola, subcentimeter mass palpable. There is a thickened area from 7-9 oclock  Neurological:      Mental Status: She is alert.           Assessment/Plan     Assessment     Problem List Items Addressed This Visit    None     Visit Diagnoses     Breast lump on left side at 6 o'clock position    -  Primary    Relevant Orders    Mammo Diagnostic Digital Tomosynthesis Bilateral With CAD    Breast pain, left                 Plan   1.  Arranged for mammogram.  2. Could be hormonal but given one sided with a lump will get imaging    Arleth Avery, " APRN  04/08/2022

## 2022-04-12 ENCOUNTER — HOSPITAL ENCOUNTER (OUTPATIENT)
Dept: ULTRASOUND IMAGING | Facility: HOSPITAL | Age: 47
Discharge: HOME | End: 2022-04-12

## 2022-04-12 ENCOUNTER — HOSPITAL ENCOUNTER (OUTPATIENT)
Dept: MAMMOGRAPHY | Facility: HOSPITAL | Age: 47
Discharge: HOME | End: 2022-04-12

## 2022-04-12 DIAGNOSIS — N63.25 BREAST LUMP ON LEFT SIDE AT 6 O'CLOCK POSITION: ICD-10-CM

## 2022-04-12 PROCEDURE — 77065 DX MAMMO INCL CAD UNI: CPT | Performed by: RADIOLOGY

## 2022-04-12 PROCEDURE — 77065 DX MAMMO INCL CAD UNI: CPT

## 2022-04-12 PROCEDURE — 76642 ULTRASOUND BREAST LIMITED: CPT | Performed by: RADIOLOGY

## 2022-04-12 PROCEDURE — G0279 TOMOSYNTHESIS, MAMMO: HCPCS

## 2022-04-12 PROCEDURE — 76642 ULTRASOUND BREAST LIMITED: CPT

## 2022-04-12 PROCEDURE — 77061 BREAST TOMOSYNTHESIS UNI: CPT | Performed by: RADIOLOGY

## 2022-04-28 ENCOUNTER — HOSPITAL ENCOUNTER (OUTPATIENT)
Dept: MRI IMAGING | Facility: HOSPITAL | Age: 47
Discharge: HOME | End: 2022-04-28
Admitting: NURSE PRACTITIONER

## 2022-04-28 DIAGNOSIS — N63.20 LEFT BREAST LUMP: ICD-10-CM

## 2022-04-28 DIAGNOSIS — R92.8 ABNORMAL FINDINGS ON DIAGNOSTIC IMAGING OF BREAST: ICD-10-CM

## 2022-04-28 PROCEDURE — 77049 MRI BREAST C-+ W/CAD BI: CPT

## 2022-04-28 PROCEDURE — A9577 INJ MULTIHANCE: HCPCS | Performed by: NURSE PRACTITIONER

## 2022-04-28 PROCEDURE — 77049 MRI BREAST C-+ W/CAD BI: CPT | Performed by: RADIOLOGY

## 2022-04-28 PROCEDURE — 0 GADOBENATE DIMEGLUMINE 529 MG/ML SOLUTION: Performed by: NURSE PRACTITIONER

## 2022-04-28 RX ADMIN — GADOBENATE DIMEGLUMINE 18 ML: 529 INJECTION, SOLUTION INTRAVENOUS at 15:39

## 2022-05-12 ENCOUNTER — HOSPITAL ENCOUNTER (OUTPATIENT)
Dept: ULTRASOUND IMAGING | Facility: HOSPITAL | Age: 47
Discharge: HOME | End: 2022-05-12
Admitting: RADIOLOGY

## 2022-05-12 DIAGNOSIS — R92.8 ABNORMAL FINDINGS ON DIAGNOSTIC IMAGING OF BREAST: ICD-10-CM

## 2022-05-12 PROCEDURE — 76642 ULTRASOUND BREAST LIMITED: CPT

## 2022-05-12 PROCEDURE — 76642 ULTRASOUND BREAST LIMITED: CPT | Performed by: RADIOLOGY

## 2022-07-07 DIAGNOSIS — R63.5 WEIGHT GAIN: ICD-10-CM

## 2022-07-07 RX ORDER — PHENTERMINE HYDROCHLORIDE 37.5 MG/1
37.5 CAPSULE ORAL EVERY MORNING
Qty: 30 CAPSULE | Refills: 0 | Status: SHIPPED | OUTPATIENT
Start: 2022-07-07 | End: 2023-03-06

## 2022-09-14 DIAGNOSIS — N63.25 BREAST LUMP ON LEFT SIDE AT 6 O'CLOCK POSITION: Primary | ICD-10-CM

## 2022-10-04 ENCOUNTER — HOSPITAL ENCOUNTER (OUTPATIENT)
Dept: MAMMOGRAPHY | Facility: HOSPITAL | Age: 47
Discharge: HOME | End: 2022-10-04
Admitting: OBSTETRICS & GYNECOLOGY

## 2022-10-04 DIAGNOSIS — Z12.39 SCREENING BREAST EXAMINATION: ICD-10-CM

## 2022-10-04 PROCEDURE — 77063 BREAST TOMOSYNTHESIS BI: CPT | Performed by: RADIOLOGY

## 2022-10-04 PROCEDURE — 77063 BREAST TOMOSYNTHESIS BI: CPT

## 2022-10-04 PROCEDURE — 77067 SCR MAMMO BI INCL CAD: CPT | Performed by: RADIOLOGY

## 2022-10-04 PROCEDURE — 77067 SCR MAMMO BI INCL CAD: CPT

## 2022-10-11 ENCOUNTER — TRANSCRIBE ORDERS (OUTPATIENT)
Dept: ADMINISTRATIVE | Facility: HOSPITAL | Age: 47
End: 2022-10-11

## 2022-10-11 DIAGNOSIS — M25.511 ACUTE PAIN OF RIGHT SHOULDER: Primary | ICD-10-CM

## 2022-10-13 ENCOUNTER — APPOINTMENT (OUTPATIENT)
Dept: MRI IMAGING | Facility: HOSPITAL | Age: 47
End: 2022-10-13

## 2022-10-14 ENCOUNTER — TRANSCRIBE ORDERS (OUTPATIENT)
Dept: PHYSICAL THERAPY | Facility: HOSPITAL | Age: 47
End: 2022-10-14

## 2022-10-14 DIAGNOSIS — M25.511 RIGHT SHOULDER PAIN, UNSPECIFIED CHRONICITY: Primary | ICD-10-CM

## 2022-10-31 ENCOUNTER — HOSPITAL ENCOUNTER (OUTPATIENT)
Dept: MRI IMAGING | Facility: HOSPITAL | Age: 47
Discharge: HOME | End: 2022-10-31
Admitting: NURSE PRACTITIONER

## 2022-10-31 DIAGNOSIS — N63.25 BREAST LUMP ON LEFT SIDE AT 6 O'CLOCK POSITION: ICD-10-CM

## 2022-10-31 PROCEDURE — 0 GADOBENATE DIMEGLUMINE 529 MG/ML SOLUTION: Performed by: NURSE PRACTITIONER

## 2022-10-31 PROCEDURE — 77049 MRI BREAST C-+ W/CAD BI: CPT | Performed by: RADIOLOGY

## 2022-10-31 PROCEDURE — A9577 INJ MULTIHANCE: HCPCS | Performed by: NURSE PRACTITIONER

## 2022-10-31 PROCEDURE — 77049 MRI BREAST C-+ W/CAD BI: CPT

## 2022-10-31 RX ADMIN — GADOBENATE DIMEGLUMINE 17 ML: 529 INJECTION, SOLUTION INTRAVENOUS at 15:56

## 2022-11-02 ENCOUNTER — TRANSCRIBE ORDERS (OUTPATIENT)
Dept: GENERAL RADIOLOGY | Facility: HOSPITAL | Age: 47
End: 2022-11-02

## 2022-11-02 ENCOUNTER — HOSPITAL ENCOUNTER (OUTPATIENT)
Dept: GENERAL RADIOLOGY | Facility: HOSPITAL | Age: 47
Discharge: HOME | End: 2022-11-02
Admitting: ORTHOPAEDIC SURGERY

## 2022-11-02 DIAGNOSIS — M25.511 RIGHT SHOULDER PAIN, UNSPECIFIED CHRONICITY: Primary | ICD-10-CM

## 2022-11-02 PROCEDURE — 73030 X-RAY EXAM OF SHOULDER: CPT

## 2022-11-07 ENCOUNTER — TELEPHONE (OUTPATIENT)
Dept: MRI IMAGING | Facility: HOSPITAL | Age: 47
End: 2022-11-07

## 2022-11-07 NOTE — TELEPHONE ENCOUNTER
Called patient with MRI Breast results. Recommended 6  Month f/u in May 2023.  Pt expressed understanding and was encouraged to call with any further questions or concerns.

## 2022-11-09 ENCOUNTER — APPOINTMENT (OUTPATIENT)
Dept: MRI IMAGING | Facility: HOSPITAL | Age: 47
End: 2022-11-09

## 2023-02-22 ENCOUNTER — HOSPITAL ENCOUNTER (OUTPATIENT)
Dept: PHYSICAL THERAPY | Facility: HOSPITAL | Age: 48
Setting detail: THERAPIES SERIES
Discharge: HOME | End: 2023-02-22
Payer: COMMERCIAL

## 2023-02-22 DIAGNOSIS — M25.511 ACUTE PAIN OF RIGHT SHOULDER: Primary | ICD-10-CM

## 2023-02-22 PROCEDURE — 97161 PT EVAL LOW COMPLEX 20 MIN: CPT | Performed by: GENERAL ACUTE CARE HOSPITAL

## 2023-02-22 NOTE — THERAPY EVALUATION
Outpatient Physical Therapy Ortho Initial Evaluation  [facility]     Patient Name: Valery Soriano  : 1975  MRN: [id number]  Today's Date: 2023      Visit Date: 2023    Patient Active Problem List   Diagnosis   • Family history of breast cancer   • IUD (intrauterine device) in place        Past Medical History:   Diagnosis Date   • Anxiety 2015    celexa   • Fibrocystic breast disease    • IUD (intrauterine device) in place     Mirena   • Obesity     somewhat overweight   • Ovarian cyst    • Screening breast examination     self admits   • Visual impairment     contacts        Past Surgical History:   Procedure Laterality Date   • AUGMENTATION MAMMAPLASTY Bilateral     silicone   • BREAST SURGERY     •  SECTION     • CHOLECYSTECTOMY  2011    laparoscopic   • COSMETIC SURGERY  2013   • GALLBLADDER SURGERY     • INTRAUTERINE DEVICE INSERTION     • OTHER SURGICAL HISTORY      IUD removal       Visit Dx:     ICD-10-CM ICD-9-CM   1. Acute pain of right shoulder  M25.511 719.41          Patient History     Row Name 23 0800             History    Chief Complaint Difficulty with daily activities;Joint stiffness;Pain  -      Type of Pain Shoulder pain  -      Date Current Problem(s) Began 22  -      Brief Description of Current Complaint Patient states that she was walking her dog on the leash whenever the dog went the opposite direction and pulled on her right arm.  Patient states that since this time she has had increased symptoms in the right shoulder.  She states that it got better for a little bit but continues to have pain specifically at the end range of shoulder movement.  -      Patient/Caregiver Goals Relieve pain;Return to prior level of function;Improve mobility;Improve strength;Know what to do to help the symptoms  -      Hand Dominance right-handed  -      Occupation/sports/leisure activities Mammography/x-ray at [employer]  -         Pain     Pain  Location Shoulder  -HP      Pain at Present 2  -HP      Pain at Best 0  -HP      Pain at Worst 7  -HP      Pain Frequency Intermittent  -HP      Pain Description Discomfort;Shooting;Sore;Sharp;Tightness  -HP      What Performance Factors Make the Current Problem(s) WORSE? Movement of the R shoulder  -HP      Pain Comments Pain is noted with shoulder abd and end range flexion the greatest  -HP      Is your sleep disturbed? Yes  -HP         Fall Risk Assessment    Any falls in the past year: Yes  -HP      Factors that contributed to the fall: Other (comment)  pulled by dog  -         Daily Activities    Primary Language English  -      Pt Participated in POC and Goals Yes  -HP            User Key  (r) = Recorded By, (t) = Taken By, (c) = Cosigned By    Initials Name Provider Type     Riki Travis, PT Physical Therapist                 PT Ortho     Row Name 02/22/23 0815       Precautions and Contraindications    Precautions/Limitations no known precautions/limitations  -HP       Posture/Observations    Posture/Observations Comments Mild TTP along the lateral deltoid region  -HP       Quarter Clearing    Quarter Clearing Upper Quarter Clearing  -HP       Sensory Screen for Light Touch- Upper Quarter Clearing    C4 (posterior shoulder) Intact  -HP    C5 (lateral upper arm) Intact  -HP    C6 (tip of thumb) Intact  -HP    C7 (tip of 3rd finger) Intact  -HP    C8 (tip of 5th finger) Intact  -HP    T1 (medial lower arm) Intact  -HP       Myotomal Screen- Upper Quarter Clearing    Shoulder flexion (C5) WNL  -HP    Elbow flexion/wrist extension (C6) WNL  -HP    Elbow extension/wrist flexion (C7) WNL  -HP    Finger flexion/ (C8) WNL  -HP    Finger abduction (T1) WNL  -HP     WNL  -HP       General ROM    RT Upper Ext Rt Shoulder ABduction;Rt Shoulder Flexion;Rt Shoulder External Rotation;Rt Shoulder Internal Rotation  -HP       Right Upper Ext    Rt Shoulder Abduction AROM WNL with pain >90 degrees  -HP    Rt  Shoulder Flexion AROM WNL with pain at end range  -HP    Rt Shoulder External Rotation AROM WNL  -HP    Rt Shoulder Internal Rotation AROM WNL  -HP          User Key  (r) = Recorded By, (t) = Taken By, (c) = Cosigned By    Initials Name Provider Type    Riki Chappell, PT Physical Therapist                            Therapy Education  Education Details: HEP included: Wall slides in flexion and abduction with overpressure, serratus wall slide, standing shoulder circles on wall  Given: HEP, Symptoms/condition management, Pain management  Program: New  How Provided: Verbal, Demonstration, Written  Provided to: Patient  Level of Understanding: Teach back education performed, Verbalized, Demonstrated      PT OP Goals     Row Name 02/22/23 0815          PT Short Term Goals    STG Date to Achieve 03/15/23  -HP     STG 1 Patient to report improvement of symptoms by 50% or greater.  -HP     STG 1 Progress New  -HP     STG 2 Patient to report adherence to HEP.  -HP     STG 2 Progress New  -HP     STG 3 Patient to report decrease in QuickDASH score to less than or equal to 10.  -HP     STG 3 Progress New  -HP        Long Term Goals    LTG Date to Achieve 04/05/23  -HP     LTG 1 Patient to report improvement of symptoms by 75% or greater.  -HP     LTG 1 Progress New  -HP     LTG 2 Patient to report independence with HEP.  -HP     LTG 2 Progress New  -HP     LTG 3 Patient to demonstrate full active range of motion of the right shoulder in all planes without pain or symptoms.  -HP     LTG 3 Progress New  -HP        Time Calculation    PT Goal Re-Cert Due Date 05/23/23  -           User Key  (r) = Recorded By, (t) = Taken By, (c) = Cosigned By    Initials Name Provider Type    Riki Chappell, PT Physical Therapist                 PT Assessment/Plan     Row Name 02/22/23 0815          PT Assessment    Functional Limitations Limitations in functional capacity and performance;Performance in leisure activities;Performance in  work activities  -HP     Impairments Joint mobility;Muscle strength;Pain;Range of motion  -     Assessment Comments Patient is a 47-year-old female that presents with a low complexity and evolving case of right shoulder pain.  Patient states that in September 2022 she was pulled by her dog leading to a fall with an outstretched arm and abduction.  Upon evaluation, patient has noticeable discomfort with right shoulder movement, specifically in right shoulder abduction greater than 90 degrees.  Patient also notes discomfort in right shoulder flexion at end range.  Patient signs and symptoms are consistent with potential involvement of the deltoid musculature of the right shoulder.  Skilled therapy is required in order to address areas of weakness/pain and return patient to prior level of function.  -HP     Please refer to paper survey for additional self-reported information Yes  -HP     Rehab Potential Good  -HP     Patient/caregiver participated in establishment of treatment plan and goals Yes  -HP     Patient would benefit from skilled therapy intervention Yes  -HP        PT Plan    PT Frequency 1x/week;2x/week  -     Predicted Duration of Therapy Intervention (PT) 8-10 visits  -HP     Planned CPT's? PT EVAL LOW COMPLEXITY: 63599;PT THER PROC EA 15 MIN: 26746;PT THER ACT EA 15 MIN: 09030;PT MANUAL THERAPY EA 15 MIN: 72509  -     Physical Therapy Interventions (Optional Details) gross motor skills;home exercise program;joint mobilization;manual therapy techniques;modalities;patient/family education;ROM (Range of Motion);strengthening;stretching  -     PT Plan Comments Patient to benefit from further skilled therapy with a focus on decreasing pain, improving functional ability, returning to prior level of function.  -           User Key  (r) = Recorded By, (t) = Taken By, (c) = Cosigned By    Initials Name Provider Type    Riki Chappell, PT Physical Therapist                                    Outcome  Measure Options: Quick DASH  Quick DASH  Open a tight or new jar.: Mild Difficulty  Do heavy household chores (e.g., wash walls, wash floors): Moderate Difficulty  Carry a shopping bag or briefcase: Mild Difficulty  Wash your back: Mild Difficulty  Use a knife to cut food: No Difficulty  Recreational activities in which you take some force or impact through your arm, should or hand (e.g. golf, hammering, tennis, etc.): Mild Difficulty  During the past week, to what extent has your arm, shoulder, or hand problem interfered with your normal social activites with family, friends, neighbors or groups?: Not at all  During the past week, were you limited in your work or other regular daily activities as a result of your arm, shoulder or hand problem?: Not limited at all  Arm, Shoulder, or hand pain: Mild  Tingling (pins and needles) in your arm, shoulder, or hand: Mild  During the past week, how much difficulty have you had sleeping because of the pain in your arm, shoulder or hand?: Mild Difficulty  Number of Questions Answered: 11  Quick DASH Score: 20.45         Time Calculation:     Start Time: 0815  Untimed Charges  PT Eval/Re-eval Minutes: 60  Total Minutes  Untimed Charges Total Minutes: 60   Total Minutes: 60     Therapy Charges for Today     Code Description Service Date Service Provider Modifiers Qty    [id number] HC PT EVAL LOW COMPLEXITY 4 2/22/2023 Riki Travis, PT GP 1          PT G-Codes  Outcome Measure Options: Quick DASH  Quick DASH Score: 20.45         Riki Travis, PT  2/22/2023

## 2023-03-06 ENCOUNTER — OFFICE VISIT (OUTPATIENT)
Dept: FAMILY MEDICINE CLINIC | Facility: CLINIC | Age: 48
End: 2023-03-06
Payer: COMMERCIAL

## 2023-03-06 VITALS
HEIGHT: 65 IN | HEART RATE: 88 BPM | SYSTOLIC BLOOD PRESSURE: 116 MMHG | TEMPERATURE: 97.3 F | DIASTOLIC BLOOD PRESSURE: 72 MMHG | RESPIRATION RATE: 14 BRPM | OXYGEN SATURATION: 98 % | WEIGHT: 198.4 LBS | BODY MASS INDEX: 33.05 KG/M2

## 2023-03-06 DIAGNOSIS — Z00.00 ROUTINE GENERAL MEDICAL EXAMINATION AT A HEALTH CARE FACILITY: Primary | ICD-10-CM

## 2023-03-06 DIAGNOSIS — Z12.11 SCREEN FOR COLON CANCER: ICD-10-CM

## 2023-03-06 PROCEDURE — 99396 PREV VISIT EST AGE 40-64: CPT | Performed by: PHYSICIAN ASSISTANT

## 2023-03-06 NOTE — PROGRESS NOTES
Subjective   Valery Soriano is a 47 y.o. female  Annual Exam (UTD on MMG, Pap. Due for colorectal screening. )      History of Present Illness  Patient is a pleasant 47-year-old white female comes in preventive medical examination denies any new problems or complaints  The following portions of the patient's history were reviewed and updated as appropriate: allergies, current medications, past social history and problem list    Review of Systems   Constitutional: Negative.    HENT: Negative.    Eyes: Negative.    Respiratory: Negative.    Cardiovascular: Negative.    Gastrointestinal: Negative.    Endocrine: Negative.    Genitourinary: Negative.    Musculoskeletal: Negative.    Skin: Negative.    Allergic/Immunologic: Negative.    Neurological: Negative.    Hematological: Negative.    Psychiatric/Behavioral: Negative.    All other systems reviewed and are negative.      Objective     Vitals:    03/06/23 1426   BP: 116/72   Pulse: 88   Resp: 14   Temp: 97.3 °F (36.3 °C)   SpO2: 98%       Physical Exam  Vitals and nursing note reviewed.   Constitutional:       General: She is not in acute distress.     Appearance: Normal appearance. She is well-developed. She is not ill-appearing, toxic-appearing or diaphoretic.   HENT:      Head: Normocephalic and atraumatic.      Right Ear: External ear normal.      Left Ear: External ear normal.   Eyes:      Conjunctiva/sclera: Conjunctivae normal.      Pupils: Pupils are equal, round, and reactive to light.   Neck:      Thyroid: No thyromegaly.      Vascular: No carotid bruit or JVD.   Cardiovascular:      Rate and Rhythm: Normal rate and regular rhythm.      Pulses: Normal pulses.      Heart sounds: Normal heart sounds. No murmur heard.  Pulmonary:      Effort: Pulmonary effort is normal. No respiratory distress.      Breath sounds: Normal breath sounds.   Abdominal:      General: Bowel sounds are normal.      Palpations: Abdomen is soft. There is no mass.      Tenderness: There is  no abdominal tenderness.   Musculoskeletal:         General: No swelling. Normal range of motion.      Cervical back: Normal range of motion and neck supple.   Lymphadenopathy:      Cervical: No cervical adenopathy.   Skin:     General: Skin is warm and dry.      Findings: No lesion or rash.   Neurological:      Mental Status: She is alert and oriented to person, place, and time.      Cranial Nerves: No cranial nerve deficit.      Sensory: No sensory deficit.      Motor: No weakness.      Coordination: Coordination normal.      Gait: Gait normal.      Deep Tendon Reflexes: Reflexes are normal and symmetric.   Psychiatric:         Mood and Affect: Mood normal.         Behavior: Behavior normal.         Thought Content: Thought content normal.         Judgment: Judgment normal.         Assessment & Plan     Diagnoses and all orders for this visit:    1. Routine general medical examination at a health care facility (Primary)  -     Comprehensive metabolic panel; Future  -     CBC & Differential; Future  -     TSH; Future  -     Lipid Panel; Future    2. Screen for colon cancer  -     Ambulatory Referral For Screening Colonoscopy    Preventive medicine discussed, diet, exercise, healthy living discussed at length.  Discussed nutrition, physical activity, healthy weight, injury prevention, misuse of tobacco, alcohol and drugs, dental health, mental health, immunizations, screening    Part of this note may be an electronic transcription/translation of spoken language to printed text using the Dragon Dictation System.

## 2023-03-08 ENCOUNTER — LAB (OUTPATIENT)
Dept: LAB | Facility: HOSPITAL | Age: 48
End: 2023-03-08
Payer: COMMERCIAL

## 2023-03-08 DIAGNOSIS — Z00.00 ROUTINE GENERAL MEDICAL EXAMINATION AT A HEALTH CARE FACILITY: ICD-10-CM

## 2023-03-08 LAB
ALBUMIN SERPL-MCNC: 4.6 G/DL (ref 3.5–5.2)
ALBUMIN/GLOB SERPL: 1.8 G/DL
ALP SERPL-CCNC: 100 U/L (ref 39–117)
ALT SERPL W P-5'-P-CCNC: 29 U/L (ref 1–33)
ANION GAP SERPL CALCULATED.3IONS-SCNC: 8.3 MMOL/L (ref 5–15)
AST SERPL-CCNC: 21 U/L (ref 1–32)
BASOPHILS # BLD AUTO: 0.03 10*3/MM3 (ref 0–0.2)
BASOPHILS NFR BLD AUTO: 0.3 % (ref 0–1.5)
BILIRUB SERPL-MCNC: 0.6 MG/DL (ref 0–1.2)
BUN SERPL-MCNC: 12 MG/DL (ref 6–20)
BUN/CREAT SERPL: 11.5 (ref 7–25)
CALCIUM SPEC-SCNC: 9.9 MG/DL (ref 8.6–10.5)
CHLORIDE SERPL-SCNC: 103 MMOL/L (ref 98–107)
CHOLEST SERPL-MCNC: 197 MG/DL (ref 0–200)
CO2 SERPL-SCNC: 25.7 MMOL/L (ref 22–29)
CREAT SERPL-MCNC: 1.04 MG/DL (ref 0.57–1)
DEPRECATED RDW RBC AUTO: 41 FL (ref 37–54)
EGFRCR SERPLBLD CKD-EPI 2021: 67.7 ML/MIN/1.73
EOSINOPHIL # BLD AUTO: 0.35 10*3/MM3 (ref 0–0.4)
EOSINOPHIL NFR BLD AUTO: 3.8 % (ref 0.3–6.2)
ERYTHROCYTE [DISTWIDTH] IN BLOOD BY AUTOMATED COUNT: 12.7 % (ref 12.3–15.4)
GLOBULIN UR ELPH-MCNC: 2.5 GM/DL
GLUCOSE SERPL-MCNC: 98 MG/DL (ref 65–99)
HCT VFR BLD AUTO: 41.6 % (ref 34–46.6)
HDLC SERPL-MCNC: 81 MG/DL (ref 40–60)
HGB BLD-MCNC: 14.1 G/DL (ref 12–15.9)
IMM GRANULOCYTES # BLD AUTO: 0.03 10*3/MM3 (ref 0–0.05)
IMM GRANULOCYTES NFR BLD AUTO: 0.3 % (ref 0–0.5)
LDLC SERPL CALC-MCNC: 104 MG/DL (ref 0–100)
LDLC/HDLC SERPL: 1.27 {RATIO}
LYMPHOCYTES # BLD AUTO: 1.58 10*3/MM3 (ref 0.7–3.1)
LYMPHOCYTES NFR BLD AUTO: 17.2 % (ref 19.6–45.3)
MCH RBC QN AUTO: 29.7 PG (ref 26.6–33)
MCHC RBC AUTO-ENTMCNC: 33.9 G/DL (ref 31.5–35.7)
MCV RBC AUTO: 87.8 FL (ref 79–97)
MONOCYTES # BLD AUTO: 0.66 10*3/MM3 (ref 0.1–0.9)
MONOCYTES NFR BLD AUTO: 7.2 % (ref 5–12)
NEUTROPHILS NFR BLD AUTO: 6.54 10*3/MM3 (ref 1.7–7)
NEUTROPHILS NFR BLD AUTO: 71.2 % (ref 42.7–76)
NRBC BLD AUTO-RTO: 0 /100 WBC (ref 0–0.2)
PLATELET # BLD AUTO: 347 10*3/MM3 (ref 140–450)
PMV BLD AUTO: 11.6 FL (ref 6–12)
POTASSIUM SERPL-SCNC: 4.2 MMOL/L (ref 3.5–5.2)
PROT SERPL-MCNC: 7.1 G/DL (ref 6–8.5)
RBC # BLD AUTO: 4.74 10*6/MM3 (ref 3.77–5.28)
SODIUM SERPL-SCNC: 137 MMOL/L (ref 136–145)
TRIGL SERPL-MCNC: 64 MG/DL (ref 0–150)
TSH SERPL DL<=0.05 MIU/L-ACNC: 4.7 UIU/ML (ref 0.27–4.2)
VLDLC SERPL-MCNC: 12 MG/DL (ref 5–40)
WBC NRBC COR # BLD: 9.19 10*3/MM3 (ref 3.4–10.8)

## 2023-03-08 PROCEDURE — 36415 COLL VENOUS BLD VENIPUNCTURE: CPT

## 2023-03-08 PROCEDURE — 80050 GENERAL HEALTH PANEL: CPT

## 2023-03-08 PROCEDURE — 80061 LIPID PANEL: CPT

## 2023-03-09 ENCOUNTER — HOSPITAL ENCOUNTER (OUTPATIENT)
Dept: PHYSICAL THERAPY | Facility: HOSPITAL | Age: 48
Setting detail: THERAPIES SERIES
Discharge: HOME | End: 2023-03-09
Payer: COMMERCIAL

## 2023-03-09 DIAGNOSIS — M25.511 ACUTE PAIN OF RIGHT SHOULDER: Primary | ICD-10-CM

## 2023-03-09 PROCEDURE — 97140 MANUAL THERAPY 1/> REGIONS: CPT | Performed by: GENERAL ACUTE CARE HOSPITAL

## 2023-03-09 PROCEDURE — 97110 THERAPEUTIC EXERCISES: CPT | Performed by: GENERAL ACUTE CARE HOSPITAL

## 2023-03-09 NOTE — THERAPY TREATMENT NOTE
Outpatient Physical Therapy Ortho Treatment Note  [facility]     Patient Name: Valery Soriano  : 1975  MRN: [id number]  Today's Date: 3/9/2023      Visit Date: 2023    Visit Dx:    ICD-10-CM ICD-9-CM   1. Acute pain of right shoulder  M25.511 719.41       Patient Active Problem List   Diagnosis   • Family history of breast cancer   • IUD (intrauterine device) in place        Past Medical History:   Diagnosis Date   • Anxiety 2015    celexa   • Fibrocystic breast disease    • IUD (intrauterine device) in place     Mirena   • Obesity     somewhat overweight   • Ovarian cyst    • Screening breast examination     self admits   • Visual impairment     contacts        Past Surgical History:   Procedure Laterality Date   • AUGMENTATION MAMMAPLASTY Bilateral     silicone   • BREAST SURGERY     •  SECTION     • CHOLECYSTECTOMY  2011    laparoscopic   • COSMETIC SURGERY  2013   • GALLBLADDER SURGERY     • INTRAUTERINE DEVICE INSERTION     • OTHER SURGICAL HISTORY      IUD removal                        PT Assessment/Plan     Row Name 23 0810          PT Assessment    Assessment Comments Pt did well with today's session and was able to complete resistance exercises without increased pain. Limited by stretching based movements the most.  -HP        PT Plan    PT Plan Comments Continue per POC  -HP           User Key  (r) = Recorded By, (t) = Taken By, (c) = Cosigned By    Initials Name Provider Type    Riki Chappell, PT Physical Therapist                   OP Exercises     Row Name 23 0810             Subjective Comments    Subjective Comments Pt states that she has had some increased discomfort recently but overall has seen some improvement  -HP         Subjective Pain    Able to rate subjective pain? yes  -HP      Pre-Treatment Pain Level 4  -HP         Total Minutes    05155 - PT Therapeutic Exercise Minutes 30  -HP      33854 - PT Manual Therapy Minutes 15  -HP          Exercise 1    Exercise Name 1 UBE  -HP      Time 1 2 min fwd and 2 min bwd  -HP      Additional Comments L8  -HP         Exercise 2    Exercise Name 2 Pulley in abd and pull apart  -HP      Time 2 5 min total  -HP         Exercise 3    Exercise Name 3 Lat pulldown both   -HP      Sets 3 2  -HP      Reps 3 10  -HP         Exercise 4    Exercise Name 4 Ladder step back  -HP      Reps 4 10  -HP         Exercise 5    Exercise Name 5 Yellow band: shoulder flexion/abd, band pull apart, band no money, serratus wall slide  -HP      Reps 5 10  -HP            User Key  (r) = Recorded By, (t) = Taken By, (c) = Cosigned By    Initials Name Provider Type     Riki Travis, PT Physical Therapist                         Manual Rx (last 36 hours)     Manual Treatments     Row Name 03/09/23 0810             Total Minutes    24068 - PT Manual Therapy Minutes 15  -HP         Manual Rx 1    Manual Rx 1 Location R shoulder  -HP      Manual Rx 1 Type Soft tissue mobilization with the use of tools along the entirity of the R shoulder  -HP      Manual Rx 1 Duration 15  -HP            User Key  (r) = Recorded By, (t) = Taken By, (c) = Cosigned By    Initials Name Provider Type     Riki Travis, PT Physical Therapist                                   Time Calculation:   Start Time: 0810  Timed Charges  40724 - PT Therapeutic Exercise Minutes: 30  78212 - PT Manual Therapy Minutes: 15  Total Minutes  Timed Charges Total Minutes: 45   Total Minutes: 45  Therapy Charges for Today     Code Description Service Date Service Provider Modifiers Qty    37228325674  PT THER PROC EA 15 MIN 3/9/2023 Riki Travis, PT GP 2    [id number]  PT MANUAL THERAPY EA 15 MIN 3/9/2023 Riki Travis, PT GP 1                    Riki Travis PT  3/9/2023

## 2023-03-13 ENCOUNTER — OFFICE VISIT (OUTPATIENT)
Dept: OBSTETRICS AND GYNECOLOGY | Facility: CLINIC | Age: 48
End: 2023-03-13
Payer: COMMERCIAL

## 2023-03-13 VITALS
WEIGHT: 194.6 LBS | HEIGHT: 65 IN | SYSTOLIC BLOOD PRESSURE: 118 MMHG | DIASTOLIC BLOOD PRESSURE: 72 MMHG | BODY MASS INDEX: 32.42 KG/M2

## 2023-03-13 DIAGNOSIS — Z12.31 BREAST CANCER SCREENING BY MAMMOGRAM: ICD-10-CM

## 2023-03-13 DIAGNOSIS — Z01.419 WOMEN'S ANNUAL ROUTINE GYNECOLOGICAL EXAMINATION: Primary | ICD-10-CM

## 2023-03-13 DIAGNOSIS — Z12.11 COLON CANCER SCREENING: ICD-10-CM

## 2023-03-13 DIAGNOSIS — N89.8 VAGINAL DISCHARGE: ICD-10-CM

## 2023-03-13 PROCEDURE — 99396 PREV VISIT EST AGE 40-64: CPT | Performed by: OBSTETRICS & GYNECOLOGY

## 2023-03-13 NOTE — PROGRESS NOTES
Gynecologic Annual Exam Note          GYN Annual Exam     Gynecologic Exam        Subjective     HPI  Valery Soriano is a 47 y.o. female, , who presents for annual well woman exam as a established patient .   Her periods are absent secondary to birth control.. She reports dysmenorrhea is none. Partner Status: Marital Status: . She is is sexually active. She has not had new partners.. STD testing recommendations have been explained to the patient and she does not desire STD testing. There were no changes to her medical or surgical history since her last visit..     Pt states she has been having white/yellow discharge with an odor for 3-4 months. She denies vaginal itching and burning. Pt also reports spotting that is sporadic.       Additional OB/GYN History   Current contraception: contraceptive methods: IUD.  Insertion date: 2019 Mirena  Desires to: continue contraception    Last Pap : 21. Result: negative. HPV: negative.   Last Completed Pap Smear     This patient has no relevant Health Maintenance data.        History of abnormal Pap smear: no  Family history of uterine, colon, breast, or ovarian cancer: yes - maternal aunt-breast, colon  Performs monthly Self-Breast Exam: yes  Last mammogram: 10/4/2022. Done at [facility].    Last Completed Mammogram          Ordered - MAMMOGRAM (Yearly) Ordered on 3/13/2023    10/04/2022  Mammo Screening Digital Tomosynthesis Bilateral With CAD    2022  Mammo Diagnostic Digital Tomosynthesis Left With CAD    2021  Mammo Screening Digital Tomosynthesis Bilateral With CAD    2020  Mammo Screening Digital Tomosynthesis Bilateral With CAD    2019  Mammo Screening Digital Tomosynthesis Bilateral With CAD    Only the first 5 history entries have been loaded, but more history exists.                History of abnormal mammogram: MRI done for nodule    Colonoscopy: has never had a colonoscopy.  Exercises Regularly: no  Feelings of  Anxiety or Depression: yes - managed well  Tobacco Usage?: No       Current Outpatient Medications:   •  citalopram (CeleXA) 20 MG tablet, Take 1 tablet by mouth Daily., Disp: 90 tablet, Rfl: 3  •  levonorgestrel (Mirena, 52 MG,) 20 MCG/24HR IUD, 1 each by Intrauterine route 1 (One) Time., Disp: , Rfl:   •  levothyroxine (SYNTHROID, LEVOTHROID) 25 MCG tablet, Take 1 tablet by mouth Daily., Disp: 30 tablet, Rfl: 11  •  naproxen (NAPROSYN) 375 MG tablet, Take 1 tablet by mouth 2 (two) times a day., Disp: 60 tablet, Rfl: 2     Patient denies the need for medication refills today.    OB History        2    Para   2    Term   2            AB        Living           SAB        IAB        Ectopic        Molar        Multiple        Live Births                    Past Medical History:   Diagnosis Date   • Anxiety 2015    celexa   • Fibrocystic breast disease    • IUD (intrauterine device) in place     Mirena   • Obesity     somewhat overweight   • Ovarian cyst    • Screening breast examination     self admits   • Visual impairment     contacts        Past Surgical History:   Procedure Laterality Date   • AUGMENTATION MAMMAPLASTY Bilateral     silicone   • BREAST SURGERY     •  SECTION     • CHOLECYSTECTOMY  2011    laparoscopic   • COSMETIC SURGERY  2013   • GALLBLADDER SURGERY     • INTRAUTERINE DEVICE INSERTION     • OTHER SURGICAL HISTORY      IUD removal       Health Maintenance   Topic Date Due   • COLORECTAL CANCER SCREENING  Never done   • COVID-19 Vaccine (4 - Booster for Pfizer series) 2021   • Annual Gynecologic Pelvic and Breast Exam  2022   • PAP SMEAR  2023 (Originally 2022)   • MAMMOGRAM  10/04/2023   • ANNUAL PHYSICAL  2024   • TDAP/TD VACCINES (2 - Td or Tdap) 2027   • HEPATITIS C SCREENING  Completed   • INFLUENZA VACCINE  Completed   • Pneumococcal Vaccine 0-64  Aged Out       The additional following portions of the patient's history  "were reviewed and updated as appropriate: allergies, current medications, past family history, past medical history, past social history, past surgical history and problem list.    Review of Systems   Constitutional: Negative.    HENT: Negative.    Eyes: Negative.    Respiratory: Negative.    Cardiovascular: Negative.    Gastrointestinal: Negative.    Endocrine: Negative.    Genitourinary: Negative.    Musculoskeletal: Negative.    Skin: Negative.    Allergic/Immunologic: Negative.    Neurological: Negative.    Hematological: Negative.    Psychiatric/Behavioral: Negative.          I have reviewed and agree with the HPI, ROS, and historical information as entered above. Kandace Vigil MD      Objective   /72   Ht 165.1 cm (65\")   Wt 88.3 kg (194 lb 9.6 oz)   LMP  (LMP Unknown)   BMI 32.38 kg/m²     Physical Exam  Vitals and nursing note reviewed. Exam conducted with a chaperone present.   Constitutional:       Appearance: She is well-developed.   HENT:      Head: Normocephalic and atraumatic.   Neck:      Thyroid: No thyroid mass or thyromegaly.   Cardiovascular:      Rate and Rhythm: Normal rate and regular rhythm.      Heart sounds: No murmur heard.  Pulmonary:      Effort: Pulmonary effort is normal. No retractions.      Breath sounds: Normal breath sounds. No wheezing, rhonchi or rales.   Chest:      Chest wall: No mass or tenderness.   Breasts:     Right: Normal. No mass, nipple discharge, skin change or tenderness.      Left: Normal. No mass, nipple discharge, skin change or tenderness.      Comments: Implants noted.  Abdominal:      General: Bowel sounds are normal.      Palpations: Abdomen is soft. Abdomen is not rigid. There is no mass.      Tenderness: There is no abdominal tenderness. There is no guarding.      Hernia: No hernia is present. There is no hernia in the left inguinal area or right inguinal area.   Genitourinary:     General: Normal vulva.      Exam position: Lithotomy " position.      Pubic Area: No rash.       Labia:         Right: No rash, tenderness or lesion.         Left: No rash, tenderness or lesion.       Urethra: No urethral pain or urethral swelling.      Vagina: Normal. No vaginal discharge or lesions.      Cervix: No cervical motion tenderness, discharge, lesion or cervical bleeding.      Uterus: Normal. Not enlarged, not fixed and not tender.       Adnexa:         Right: No mass, tenderness or fullness.          Left: No mass, tenderness or fullness.        Rectum: No external hemorrhoid.      Comments: IUD strings seen 3 cm from os.  Mucus discharge noted.  Musculoskeletal:      Cervical back: Normal range of motion. No muscular tenderness.   Neurological:      Mental Status: She is alert and oriented to person, place, and time.   Psychiatric:         Behavior: Behavior normal.            Assessment and Plan    Problem List Items Addressed This Visit    None  Visit Diagnoses     Women's annual routine gynecological examination    -  Primary    Breast cancer screening by mammogram        Relevant Orders    Mammo Screening Digital Tomosynthesis Bilateral With CAD    Vaginal discharge        Relevant Orders    NuSwab BV & Candida - Swab, Vagina          1. GYN annual well woman exam.   2. Pap guidelines reviewed.  3. Encouraged use of condoms for STD prevention.  4. Reviewed monthly self breast exams.  Instructed to call with lumps, pain, or breast discharge.    5. Ordered Mammogram today  6. Recommended use of Vitamin D replacement and getting adequate calcium in her diet. (1500mg)  7. Reviewed BMI and weight loss as preventative health measures.   8. Reviewed exercise as a preventative health measures.   9. Reccommended Flu Vaccine in Fall of each year.  10. Symptoms of menopausal transition reviewed with patient.   11. RTC in 1 year or PRN with problems.  12. Discussed importance of routine colon cancer screening. Reviewed current guidelines. Recommended colonoscopy  after age 45.  13. Return in about 1 year (around 3/13/2024) for Annual physical.     Kandace Vigil MD  03/13/2023

## 2023-03-14 ENCOUNTER — HOSPITAL ENCOUNTER (OUTPATIENT)
Dept: PHYSICAL THERAPY | Facility: HOSPITAL | Age: 48
Setting detail: THERAPIES SERIES
Discharge: HOME | End: 2023-03-14
Payer: COMMERCIAL

## 2023-03-14 DIAGNOSIS — M25.511 ACUTE PAIN OF RIGHT SHOULDER: Primary | ICD-10-CM

## 2023-03-14 PROCEDURE — 97110 THERAPEUTIC EXERCISES: CPT | Performed by: GENERAL ACUTE CARE HOSPITAL

## 2023-03-14 NOTE — THERAPY TREATMENT NOTE
Outpatient Physical Therapy Ortho Treatment Note  [facility]     Patient Name: Valery Soriano  : 1975  MRN: [id number]  Today's Date: 3/14/2023      Visit Date: 2023    Visit Dx:    ICD-10-CM ICD-9-CM   1. Acute pain of right shoulder  M25.511 719.41       Patient Active Problem List   Diagnosis   • Family history of breast cancer   • IUD (intrauterine device) in place        Past Medical History:   Diagnosis Date   • Anxiety 2015    celexa   • Fibrocystic breast disease    • IUD (intrauterine device) in place     Mirena   • Obesity     somewhat overweight   • Ovarian cyst    • Screening breast examination     self admits   • Visual impairment     contacts        Past Surgical History:   Procedure Laterality Date   • AUGMENTATION MAMMAPLASTY Bilateral     silicone   • BREAST SURGERY     •  SECTION     • CHOLECYSTECTOMY  2011    laparoscopic   • COSMETIC SURGERY  2013   • GALLBLADDER SURGERY     • INTRAUTERINE DEVICE INSERTION     • OTHER SURGICAL HISTORY      IUD removal                        PT Assessment/Plan     Row Name 23 0899          PT Assessment    Assessment Comments Patient did well with today's exercises and did not have any increased complaints of pain/soreness.  Patient did have notes of discomfort with shoulder exercises specifically with abduction the greatest.  -HP        PT Plan    PT Plan Comments Continue per plan of care  -HP           User Key  (r) = Recorded By, (t) = Taken By, (c) = Cosigned By    Initials Name Provider Type    Riki Chappell PT Physical Therapist                   OP Exercises     Row Name 23 4500             Subjective Comments    Subjective Comments Pt states she did a lot over the weekend and had some discomfort in the shoulder today  -HP         Subjective Pain    Able to rate subjective pain? yes  -HP      Pre-Treatment Pain Level 5  -HP      Post-Treatment Pain Level 5  -HP         Total Minutes    44670 - PT  Therapeutic Exercise Minutes 40  -HP         Exercise 1    Exercise Name 1 UBE  -HP      Time 1 2 min fwd and 2 min bwd  -HP      Additional Comments L8  -HP         Exercise 2    Exercise Name 2 Row machine both   -HP      Sets 2 2  -HP      Reps 2 10 each  -HP      Additional Comments 3 pl  -HP         Exercise 3    Exercise Name 3 Lat pulldown  -HP      Sets 3 2  -HP      Reps 3 10  -HP      Additional Comments both ; 2 pl  -HP         Exercise 4    Exercise Name 4 Towel roll between shoulder blades supine shoulder circuit  -HP      Reps 4 10 each  -HP         Exercise 5    Exercise Name 5 Prone: shoulder flexion, Y, W, thumb up T, thumb down T, and ext  -HP      Reps 5 10 each  -HP         Exercise 6    Exercise Name 6 2# DB supine: chest flyes, shoulder flexion  -HP      Reps 6 10  -HP         Exercise 7    Exercise Name 7 7.5# KB: chest press and chest press +  -HP      Reps 7 10 each  -HP         Exercise 8    Exercise Name 8 Standing 2# and 3# DB: shoulder flexion/abduction, shoulder press, W's, extensions, no monies  -HP      Reps 8 10 each  -HP            User Key  (r) = Recorded By, (t) = Taken By, (c) = Cosigned By    Initials Name Provider Type    HP Riki Travis, PT Physical Therapist                                                Time Calculation:   Start Time: 0810  Timed Charges  59543 - PT Therapeutic Exercise Minutes: 40  Total Minutes  Timed Charges Total Minutes: 40   Total Minutes: 40  Therapy Charges for Today     Code Description Service Date Service Provider Modifiers Qty    68675176120 HC PT THER PROC EA 15 MIN 3/14/2023 Riki Travis, PT GP 3                    Riki Travis PT  3/14/2023

## 2023-03-15 LAB
A VAGINAE DNA VAG QL NAA+PROBE: NORMAL SCORE
BVAB2 DNA VAG QL NAA+PROBE: NORMAL SCORE
C ALBICANS DNA VAG QL NAA+PROBE: NEGATIVE
C GLABRATA DNA VAG QL NAA+PROBE: NEGATIVE
MEGA1 DNA VAG QL NAA+PROBE: NORMAL SCORE

## 2023-03-17 ENCOUNTER — HOSPITAL ENCOUNTER (OUTPATIENT)
Dept: PHYSICAL THERAPY | Facility: HOSPITAL | Age: 48
Setting detail: THERAPIES SERIES
Discharge: HOME | End: 2023-03-17
Payer: COMMERCIAL

## 2023-03-17 DIAGNOSIS — M25.511 ACUTE PAIN OF RIGHT SHOULDER: Primary | ICD-10-CM

## 2023-03-17 PROCEDURE — 97110 THERAPEUTIC EXERCISES: CPT | Performed by: GENERAL ACUTE CARE HOSPITAL

## 2023-03-17 NOTE — THERAPY TREATMENT NOTE
"    Outpatient Physical Therapy Ortho Treatment Note  [facility]     Patient Name: Valery Soriano  : 1975  MRN: [id number]  Today's Date: 3/17/2023      Visit Date: 2023    Visit Dx:    ICD-10-CM ICD-9-CM   1. Acute pain of right shoulder  M25.511 719.41       Patient Active Problem List   Diagnosis   • Family history of breast cancer   • IUD (intrauterine device) in place        Past Medical History:   Diagnosis Date   • Anxiety 2015    celexa   • Fibrocystic breast disease    • IUD (intrauterine device) in place     Mirena   • Obesity     somewhat overweight   • Ovarian cyst    • Screening breast examination     self admits   • Visual impairment     contacts        Past Surgical History:   Procedure Laterality Date   • AUGMENTATION MAMMAPLASTY Bilateral     silicone   • BREAST SURGERY     •  SECTION     • CHOLECYSTECTOMY  2011    laparoscopic   • COSMETIC SURGERY  2013   • GALLBLADDER SURGERY     • INTRAUTERINE DEVICE INSERTION     • OTHER SURGICAL HISTORY      IUD removal                        PT Assessment/Plan     Row Name 23 0815          PT Assessment    Assessment Comments Patient continues to note diffuse pain throughout the right shoulder that she states feels \"deep.\"  For this reason, time was spent reassessing patient's complaints of pain.  It is my professional opinion that she may have some involvement structurally of the right shoulder joint.  Her signs and symptoms are consistent with a potential labral involvement.  For this reason, patient was advised to contact referring MD to determine if further imaging is required and to determine if an MRI will be approved by her insurance at this time.  -HP        PT Plan    PT Plan Comments Continue per plan of care and progressing patient as able.  -           User Key  (r) = Recorded By, (t) = Taken By, (c) = Cosigned By    Initials Name Provider Type    Riki Chappell, PT Physical Therapist                   OP " Exercises     Row Name 03/17/23 0815             Subjective Comments    Subjective Comments Pt states that she is doing about the same  -HP         Subjective Pain    Able to rate subjective pain? yes  -HP      Pre-Treatment Pain Level 2  -HP      Post-Treatment Pain Level 2  -HP         Total Minutes    30966 - PT Therapeutic Exercise Minutes 30  -HP         Exercise 1    Exercise Name 1 UBE  -HP      Time 1 2 min fwd and bwd  -HP      Additional Comments L8  -HP         Exercise 2    Exercise Name 2 Supine: 3# bar shoulder flexion, scaption, horizontal abd/add, chest press, chest press +  -HP      Reps 2 10 each  -HP         Exercise 3    Exercise Name 3 2# and 3# DB shoulder flexion, abduciton, W, ER, shoulder press  -HP      Reps 3 10 each  -HP            User Key  (r) = Recorded By, (t) = Taken By, (c) = Cosigned By    Initials Name Provider Type    HP Riki Travis, PT Physical Therapist                                                Time Calculation:   Start Time: 0815  Timed Charges  71663 - PT Therapeutic Exercise Minutes: 30  Total Minutes  Timed Charges Total Minutes: 30   Total Minutes: 30  Therapy Charges for Today     Code Description Service Date Service Provider Modifiers Qty    99947950633 HC PT THER PROC EA 15 MIN 3/17/2023 Riki Travis, PT GP 2                    Riki Travis, PT  3/17/2023

## 2023-03-21 ENCOUNTER — HOSPITAL ENCOUNTER (OUTPATIENT)
Dept: PHYSICAL THERAPY | Facility: HOSPITAL | Age: 48
Setting detail: THERAPIES SERIES
Discharge: HOME | End: 2023-03-21
Payer: COMMERCIAL

## 2023-03-21 DIAGNOSIS — M25.511 ACUTE PAIN OF RIGHT SHOULDER: Primary | ICD-10-CM

## 2023-03-21 PROCEDURE — 97110 THERAPEUTIC EXERCISES: CPT | Performed by: GENERAL ACUTE CARE HOSPITAL

## 2023-03-21 NOTE — THERAPY PROGRESS REPORT/RE-CERT
Outpatient Physical Therapy Ortho Progress Note   [facility]     Patient Name: Valery Soriano  : 1975  MRN: [id number]  Today's Date: 3/21/2023      Visit Date: 2023    Visit Dx:    ICD-10-CM ICD-9-CM   1. Acute pain of right shoulder  M25.511 719.41       Patient Active Problem List   Diagnosis   • Family history of breast cancer   • IUD (intrauterine device) in place        Past Medical History:   Diagnosis Date   • Anxiety 2015    celexa   • Fibrocystic breast disease    • IUD (intrauterine device) in place     Mirena   • Obesity     somewhat overweight   • Ovarian cyst    • Screening breast examination     self admits   • Visual impairment     contacts        Past Surgical History:   Procedure Laterality Date   • AUGMENTATION MAMMAPLASTY Bilateral     silicone   • BREAST SURGERY     •  SECTION     • CHOLECYSTECTOMY  2011    laparoscopic   • COSMETIC SURGERY  2013   • GALLBLADDER SURGERY     • INTRAUTERINE DEVICE INSERTION     • OTHER SURGICAL HISTORY      IUD removal        PT Ortho     Row Name 23 0815       Subjective Comments    Subjective Comments Patient states she has had no changes  -HP       Subjective Pain    Able to rate subjective pain? yes  -HP    Pre-Treatment Pain Level 2  -HP    Post-Treatment Pain Level 2  -HP       MMT (Manual Muscle Testing)    Rt Upper Ext Rt Shoulder Flexion;Rt Shoulder Extension;Rt Shoulder ABduction;Rt Shoulder Internal Rotation;Rt Shoulder External Rotation  -HP    Lt Upper Ext Lt Shoulder Flexion;Lt Shoulder Extension;Lt Shoulder ABduction;Lt Shoulder Internal Rotation;Lt Shoulder External Rotation  -HP       MMT Right Upper Ext    Rt Shoulder Flexion MMT, Gross Movement --  16#  -HP    Rt Shoulder Extension MMT, Gross Movement --  16#  -HP    Rt Shoulder ABduction MMT, Gross Movement --  14.8#  -HP    Rt Shoulder Internal Rotation MMT, Gross Movement --  16#  -HP    Rt Shoulder External Rotation MMT, Gross Movement --  15#  -HP        MMT Left Upper Ext    Lt Shoulder Flexion MMT, Gross Movement --  16.5#  -HP    Lt Shoulder Extension MMT, Gross Movement --  17#  -HP    Lt Shoulder ABduction MMT, Gross Movement --  14.5#  -HP    Lt Shoulder Internal Rotation MMT, Gross Movement --  16.5#  -HP    Lt Shoulder External Rotation MMT, Gross Movement --  15#  -          User Key  (r) = Recorded By, (t) = Taken By, (c) = Cosigned By    Initials Name Provider Type     Riki Travis PT Physical Therapist                             PT Assessment/Plan     Row Name 03/21/23 0815          PT Assessment    Functional Limitations Limitations in functional capacity and performance;Performance in leisure activities;Performance in work activities  -     Impairments Joint mobility;Muscle strength;Pain;Range of motion  -     Assessment Comments Patient has made minimal improvement to this point in her therapy sessions.  For this reason, patient was advised to follow-up with MD to determine the next steps in the course of her care.  Patient displays full active range of motion of the the bilateral shoulders but does have end range pain/difficulty with external rotation and flexion of the right shoulder.  Patient also demonstrates adequate strength.  For this reason, it is my opinion that patient may have structural damage to the right labrum potentially.  -     Please refer to paper survey for additional self-reported information Yes  -HP     Rehab Potential Good  -     Patient/caregiver participated in establishment of treatment plan and goals Yes  -     Patient would benefit from skilled therapy intervention Yes  -HP        PT Plan    PT Plan Comments Patient to follow-up with MD to receive MRI to determine next steps in course of care.  -           User Key  (r) = Recorded By, (t) = Taken By, (c) = Cosigned By    Initials Name Provider Type    Riki Chappell PT Physical Therapist                   OP Exercises     Row Name 03/21/23  0815             Subjective Comments    Subjective Comments Patient states she has had no changes  -HP         Subjective Pain    Able to rate subjective pain? yes  -HP      Pre-Treatment Pain Level 2  -HP      Post-Treatment Pain Level 2  -HP         Total Minutes    27866 - PT Therapeutic Exercise Minutes 25  -HP         Exercise 1    Exercise Name 1 UBE  -HP      Time 1 2 min fwd and bwd  -HP      Additional Comments L8  -HP         Exercise 2    Exercise Name 2 Time was spent updating objective measures and plan of care with patient  -HP      Time 2 21 min  -HP            User Key  (r) = Recorded By, (t) = Taken By, (c) = Cosigned By    Initials Name Provider Type     Riki Travis, PT Physical Therapist                              PT OP Goals     Row Name 03/21/23 0815          PT Short Term Goals    STG Date to Achieve 03/15/23  -HP     STG 1 Patient to report improvement of symptoms by 50% or greater.  -HP     STG 1 Progress Ongoing  -HP     STG 2 Patient to report adherence to HEP.  -HP     STG 2 Progress Ongoing  -HP     STG 3 Patient to report decrease in QuickDASH score to less than or equal to 10.  -HP     STG 3 Progress Ongoing  -HP        Long Term Goals    LTG Date to Achieve 04/05/23  -HP     LTG 1 Patient to report improvement of symptoms by 75% or greater.  -HP     LTG 1 Progress Ongoing  -HP     LTG 2 Patient to report independence with HEP.  -HP     LTG 2 Progress Ongoing  -HP     LTG 3 Patient to demonstrate full active range of motion of the right shoulder in all planes without pain or symptoms.  -HP     LTG 3 Progress Ongoing  -HP        Time Calculation    PT Goal Re-Cert Due Date 05/23/23  -           User Key  (r) = Recorded By, (t) = Taken By, (c) = Cosigned By    Initials Name Provider Type    Riki Chappell PT Physical Therapist                     Outcome Measure Options: Quick DASH  Quick DASH  Open a tight or new jar.: Moderate Difficulty  Do heavy household chores (e.g.,  wash walls, wash floors): Moderate Difficulty  Carry a shopping bag or briefcase: Mild Difficulty  Wash your back: No Difficulty  Use a knife to cut food: No Difficulty  Recreational activities in which you take some force or impact through your arm, should or hand (e.g. golf, hammering, tennis, etc.): No Difficulty  During the past week, to what extent has your arm, shoulder, or hand problem interfered with your normal social activites with family, friends, neighbors or groups?: Slightly  During the past week, were you limited in your work or other regular daily activities as a result of your arm, shoulder or hand problem?: Slightly Limited  Arm, Shoulder, or hand pain: Mild  Tingling (pins and needles) in your arm, shoulder, or hand: Mild  During the past week, how much difficulty have you had sleeping because of the pain in your arm, shoulder or hand?: Mild Difficulty  Number of Questions Answered: 11  Quick DASH Score: 22.73         Time Calculation:   Start Time: 0815  Timed Charges  77658 - PT Therapeutic Exercise Minutes: 25  Total Minutes  Timed Charges Total Minutes: 25   Total Minutes: 25  Therapy Charges for Today     Code Description Service Date Service Provider Modifiers Qty    74816562482 HC PT THER PROC EA 15 MIN 3/21/2023 Riki Travis, PT GP 2          PT G-Codes  Outcome Measure Options: Quick DASH  Quick DASH Score: 22.73         Riki Travis, PT  3/21/2023

## 2023-04-18 ENCOUNTER — HOSPITAL ENCOUNTER (OUTPATIENT)
Dept: MRI IMAGING | Facility: HOSPITAL | Age: 48
Discharge: HOME | End: 2023-04-18
Admitting: ORTHOPAEDIC SURGERY
Payer: COMMERCIAL

## 2023-04-18 DIAGNOSIS — M25.511 ACUTE PAIN OF RIGHT SHOULDER: ICD-10-CM

## 2023-04-18 PROCEDURE — 73221 MRI JOINT UPR EXTREM W/O DYE: CPT

## 2023-04-20 ENCOUNTER — DOCUMENTATION (OUTPATIENT)
Dept: PHYSICAL THERAPY | Facility: HOSPITAL | Age: 48
End: 2023-04-20
Payer: COMMERCIAL

## 2023-04-20 DIAGNOSIS — E03.9 HYPOTHYROIDISM, UNSPECIFIED TYPE: ICD-10-CM

## 2023-04-20 DIAGNOSIS — M25.511 ACUTE PAIN OF RIGHT SHOULDER: Primary | ICD-10-CM

## 2023-04-20 DIAGNOSIS — Z00.00 ROUTINE GENERAL MEDICAL EXAMINATION AT A HEALTH CARE FACILITY: ICD-10-CM

## 2023-04-20 RX ORDER — CITALOPRAM 20 MG/1
20 TABLET ORAL DAILY
Qty: 90 TABLET | Refills: 3 | Status: SHIPPED | OUTPATIENT
Start: 2023-04-20

## 2023-04-20 RX ORDER — LEVOTHYROXINE SODIUM 0.03 MG/1
25 TABLET ORAL DAILY
Qty: 90 TABLET | Refills: 3 | Status: SHIPPED | OUTPATIENT
Start: 2023-04-20

## 2023-04-20 NOTE — THERAPY DISCHARGE NOTE
Outpatient Physical Therapy Discharge Summary         Patient Name: Valery Soriano  : 1975  MRN: [id number]    Today's Date: 2023    Visit Dx:    ICD-10-CM ICD-9-CM   1. Acute pain of right shoulder  M25.511 719.41           OP PT Discharge Summary  Date of Discharge: 23  Discharge Instructions/Additional Comments: Patient was seen for her initial evaluation and several subsequent follow-up visits.  However, patient was not making substantial improvements and for this reason was referred back to her MD.  Patient had an MRI completed.  For this reason, patient to be discharged at this time.      Time Calculation:                    Riki Travis, PT  2023

## 2023-05-31 RX ORDER — SODIUM PICOSULFATE, MAGNESIUM OXIDE, AND ANHYDROUS CITRIC ACID 10; 3.5; 12 MG/160ML; G/160ML; G/160ML
350 LIQUID ORAL TAKE AS DIRECTED
Qty: 320 ML | Refills: 0 | Status: SHIPPED | OUTPATIENT
Start: 2023-05-31

## 2023-06-05 ENCOUNTER — OUTSIDE FACILITY SERVICE (OUTPATIENT)
Dept: GASTROENTEROLOGY | Facility: CLINIC | Age: 48
End: 2023-06-05
Payer: COMMERCIAL

## 2023-06-05 PROCEDURE — 45385 COLONOSCOPY W/LESION REMOVAL: CPT | Performed by: INTERNAL MEDICINE

## 2023-10-04 ENCOUNTER — HOSPITAL ENCOUNTER (OUTPATIENT)
Dept: MAMMOGRAPHY | Facility: HOSPITAL | Age: 48
Discharge: HOME | End: 2023-10-04
Admitting: OBSTETRICS & GYNECOLOGY
Payer: COMMERCIAL

## 2023-10-04 DIAGNOSIS — Z12.31 BREAST CANCER SCREENING BY MAMMOGRAM: ICD-10-CM

## 2023-10-04 PROCEDURE — 77063 BREAST TOMOSYNTHESIS BI: CPT

## 2023-10-04 PROCEDURE — 77067 SCR MAMMO BI INCL CAD: CPT

## 2024-03-08 ENCOUNTER — OFFICE VISIT (OUTPATIENT)
Dept: FAMILY MEDICINE CLINIC | Facility: CLINIC | Age: 49
End: 2024-03-08
Payer: COMMERCIAL

## 2024-03-08 VITALS
HEIGHT: 65 IN | SYSTOLIC BLOOD PRESSURE: 118 MMHG | WEIGHT: 189 LBS | OXYGEN SATURATION: 98 % | BODY MASS INDEX: 31.49 KG/M2 | DIASTOLIC BLOOD PRESSURE: 78 MMHG | TEMPERATURE: 97.1 F | RESPIRATION RATE: 14 BRPM | HEART RATE: 80 BPM

## 2024-03-08 DIAGNOSIS — Z00.00 ROUTINE GENERAL MEDICAL EXAMINATION AT A HEALTH CARE FACILITY: Primary | ICD-10-CM

## 2024-03-08 DIAGNOSIS — R94.31 ABNORMAL EKG: ICD-10-CM

## 2024-03-08 PROCEDURE — 93000 ELECTROCARDIOGRAM COMPLETE: CPT | Performed by: PHYSICIAN ASSISTANT

## 2024-03-08 PROCEDURE — 99396 PREV VISIT EST AGE 40-64: CPT | Performed by: PHYSICIAN ASSISTANT

## 2024-03-08 NOTE — PROGRESS NOTES
Subjective   Valery Soriano is a 48 y.o. female  Annual Exam (Not fasting. UTD on colonoscopy, Pap, MMG. )      History of Present Illness  Patient is a pleasant 48-year-old female who comes in for preventive medical examination patient states she is not fasting patient states she did have episode of chest pain couple months ago.  The following portions of the patient's history were reviewed and updated as appropriate: allergies, current medications, past social history and problem list    Review of Systems   Constitutional: Negative.    HENT: Negative.     Eyes: Negative.    Respiratory: Negative.     Cardiovascular: Negative.    Gastrointestinal: Negative.    Endocrine: Negative.    Genitourinary: Negative.    Musculoskeletal: Negative.    Skin: Negative.    Allergic/Immunologic: Negative.    Neurological: Negative.    Hematological: Negative.    Psychiatric/Behavioral: Negative.     All other systems reviewed and are negative.      Objective     Vitals:    03/08/24 1432   BP: 118/78   Pulse: 80   Resp: 14   Temp: 97.1 °F (36.2 °C)   SpO2: 98%       Physical Exam  Vitals and nursing note reviewed.   Constitutional:       General: She is not in acute distress.     Appearance: Normal appearance. She is well-developed. She is not ill-appearing, toxic-appearing or diaphoretic.   HENT:      Head: Normocephalic and atraumatic.      Right Ear: External ear normal.      Left Ear: External ear normal.   Eyes:      Conjunctiva/sclera: Conjunctivae normal.      Pupils: Pupils are equal, round, and reactive to light.   Neck:      Thyroid: No thyromegaly.      Vascular: No carotid bruit or JVD.   Cardiovascular:      Rate and Rhythm: Normal rate and regular rhythm.      Pulses: Normal pulses.      Heart sounds: Normal heart sounds. No murmur heard.  Pulmonary:      Effort: Pulmonary effort is normal. No respiratory distress.      Breath sounds: Normal breath sounds.   Abdominal:      General: Bowel sounds are normal.       Palpations: Abdomen is soft. There is no mass.      Tenderness: There is no abdominal tenderness.   Musculoskeletal:         General: No swelling. Normal range of motion.      Cervical back: Normal range of motion and neck supple.   Lymphadenopathy:      Cervical: No cervical adenopathy.   Skin:     General: Skin is warm and dry.      Findings: No lesion or rash.   Neurological:      Mental Status: She is alert and oriented to person, place, and time.      Cranial Nerves: No cranial nerve deficit.      Sensory: No sensory deficit.      Motor: No weakness.      Coordination: Coordination normal.      Gait: Gait normal.      Deep Tendon Reflexes: Reflexes are normal and symmetric.   Psychiatric:         Mood and Affect: Mood normal.         Behavior: Behavior normal.         Thought Content: Thought content normal.         Judgment: Judgment normal.         ECG 12 Lead    Date/Time: 3/8/2024 3:20 PM  Performed by: Giovany Sampson PA    Authorized by: Giovany Sampson PA  Comparison: compared with previous ECG   Rhythm: sinus rhythm  Other findings: T wave abnormality    Clinical impression: abnormal EKG  Comments: ST changes from previous EKG..         Assessment & Plan     Diagnoses and all orders for this visit:    1. Routine general medical examination at a health care facility (Primary)  -     Comprehensive Metabolic Panel; Future  -     Lipid Panel; Future  -     TSH; Future  -     CBC (No Diff); Future    2. Abnormal EKG  -     ECG 12 Lead  -     Treadmill Stress Test; Future     Preventive medicine discussed, diet, exercise, healthy living discussed at length.  Discussed nutrition, physical activity, healthy weight, injury prevention, misuse of tobacco, alcohol and drugs, dental health, mental health, immunizations, screening    Part of this note may be an electronic transcription/translation of spoken language to printed text using the Dragon Dictation System.

## 2024-03-11 ENCOUNTER — LAB (OUTPATIENT)
Dept: LAB | Facility: HOSPITAL | Age: 49
End: 2024-03-11
Payer: COMMERCIAL

## 2024-03-11 DIAGNOSIS — Z00.00 ROUTINE GENERAL MEDICAL EXAMINATION AT A HEALTH CARE FACILITY: ICD-10-CM

## 2024-03-11 LAB
ALBUMIN SERPL-MCNC: 4.6 G/DL (ref 3.5–5.2)
ALBUMIN/GLOB SERPL: 2.1 G/DL
ALP SERPL-CCNC: 101 U/L (ref 39–117)
ALT SERPL W P-5'-P-CCNC: 24 U/L (ref 1–33)
ANION GAP SERPL CALCULATED.3IONS-SCNC: 14.4 MMOL/L (ref 5–15)
AST SERPL-CCNC: 21 U/L (ref 1–32)
BILIRUB SERPL-MCNC: 0.5 MG/DL (ref 0–1.2)
BUN SERPL-MCNC: 12 MG/DL (ref 6–20)
BUN/CREAT SERPL: 13.2 (ref 7–25)
CALCIUM SPEC-SCNC: 9.9 MG/DL (ref 8.6–10.5)
CHLORIDE SERPL-SCNC: 106 MMOL/L (ref 98–107)
CHOLEST SERPL-MCNC: 198 MG/DL (ref 0–200)
CO2 SERPL-SCNC: 26.6 MMOL/L (ref 22–29)
CREAT SERPL-MCNC: 0.91 MG/DL (ref 0.57–1)
DEPRECATED RDW RBC AUTO: 43 FL (ref 37–54)
EGFRCR SERPLBLD CKD-EPI 2021: 79 ML/MIN/1.73
ERYTHROCYTE [DISTWIDTH] IN BLOOD BY AUTOMATED COUNT: 12.8 % (ref 12.3–15.4)
GLOBULIN UR ELPH-MCNC: 2.2 GM/DL
GLUCOSE SERPL-MCNC: 101 MG/DL (ref 65–99)
HCT VFR BLD AUTO: 42.5 % (ref 34–46.6)
HDLC SERPL-MCNC: 78 MG/DL (ref 40–60)
HGB BLD-MCNC: 14.3 G/DL (ref 12–15.9)
LDLC SERPL CALC-MCNC: 106 MG/DL (ref 0–100)
LDLC/HDLC SERPL: 1.34 {RATIO}
MCH RBC QN AUTO: 30.8 PG (ref 26.6–33)
MCHC RBC AUTO-ENTMCNC: 33.6 G/DL (ref 31.5–35.7)
MCV RBC AUTO: 91.6 FL (ref 79–97)
PLATELET # BLD AUTO: 395 10*3/MM3 (ref 140–450)
PMV BLD AUTO: 11.1 FL (ref 6–12)
POTASSIUM SERPL-SCNC: 4.9 MMOL/L (ref 3.5–5.2)
PROT SERPL-MCNC: 6.8 G/DL (ref 6–8.5)
RBC # BLD AUTO: 4.64 10*6/MM3 (ref 3.77–5.28)
SODIUM SERPL-SCNC: 147 MMOL/L (ref 136–145)
TRIGL SERPL-MCNC: 79 MG/DL (ref 0–150)
TSH SERPL DL<=0.05 MIU/L-ACNC: 5.26 UIU/ML (ref 0.27–4.2)
VLDLC SERPL-MCNC: 14 MG/DL (ref 5–40)
WBC NRBC COR # BLD AUTO: 9.49 10*3/MM3 (ref 3.4–10.8)

## 2024-03-11 PROCEDURE — 80050 GENERAL HEALTH PANEL: CPT

## 2024-03-11 PROCEDURE — 36415 COLL VENOUS BLD VENIPUNCTURE: CPT

## 2024-03-11 PROCEDURE — 80061 LIPID PANEL: CPT

## 2024-03-18 ENCOUNTER — OFFICE VISIT (OUTPATIENT)
Dept: OBSTETRICS AND GYNECOLOGY | Facility: CLINIC | Age: 49
End: 2024-03-18
Payer: COMMERCIAL

## 2024-03-18 VITALS
DIASTOLIC BLOOD PRESSURE: 80 MMHG | HEIGHT: 65 IN | BODY MASS INDEX: 31.82 KG/M2 | SYSTOLIC BLOOD PRESSURE: 126 MMHG | WEIGHT: 191 LBS

## 2024-03-18 DIAGNOSIS — Z80.3 FAMILY HISTORY OF BREAST CANCER: ICD-10-CM

## 2024-03-18 DIAGNOSIS — Z12.31 BREAST CANCER SCREENING BY MAMMOGRAM: ICD-10-CM

## 2024-03-18 DIAGNOSIS — Z97.5 IUD (INTRAUTERINE DEVICE) IN PLACE: Primary | ICD-10-CM

## 2024-03-18 DIAGNOSIS — Z01.419 WOMEN'S ANNUAL ROUTINE GYNECOLOGICAL EXAMINATION: ICD-10-CM

## 2024-03-18 PROCEDURE — 99396 PREV VISIT EST AGE 40-64: CPT | Performed by: OBSTETRICS & GYNECOLOGY

## 2024-03-18 NOTE — PROGRESS NOTES
Gynecologic Annual Exam Note          GYN Annual Exam     Gynecologic Exam        Subjective     HPI  Valery Soriano is a 48 y.o. female, , who presents for annual well woman exam as a established patient. There were no changes to her medical or surgical history since her last visit..  Patient's last menstrual period was 2024.   Her periods are sporadic spotting.  The flow is spotting. She denies dysmenorrhea. Marital Status: . She is sexually active. She has not had new partners.. STD testing recommendations have been explained to the patient and she declines STD testing.    The patient would like to discuss the following complaints today: none    Additional OB/GYN History   contraceptive methods: IUD.  Insertion date: - Mirena  Desires to: continue contraception      Last Pap : 21. Result: negative. HPV: negative.   Last Completed Pap Smear       This patient has no relevant Health Maintenance data.          History of abnormal Pap smear: no  Family history of uterine, colon, breast, or ovarian cancer: yes - maternal aunt with breast cancer and a separate maternal aunt with colon cancer  Performs monthly Self-Breast Exam: yes  Last mammogram: 10/4/23. Done at .    Last Completed Mammogram            Ordered - MAMMOGRAM (Yearly) Ordered on 3/18/2024      10/04/2023  Mammo Screening Digital Tomosynthesis Bilateral With CAD    10/04/2022  Mammo Screening Digital Tomosynthesis Bilateral With CAD    2022  Mammo Diagnostic Digital Tomosynthesis Left With CAD    2021  Mammo Screening Digital Tomosynthesis Bilateral With CAD    2020  Mammo Screening Digital Tomosynthesis Bilateral With CAD    Only the first 5 history entries have been loaded, but more history exists.                    Colonoscopy: has had a colonoscopy 8 months ago  Exercises Regularly: no  Feelings of Anxiety or Depression: no  Tobacco Usage?: No       Current Outpatient Medications:     citalopram  (CeleXA) 20 MG tablet, Take 1 tablet by mouth Daily., Disp: 90 tablet, Rfl: 3    ketoconazole (NIZORAL) 2 % cream, Apply to affected areas on face two times daily when flared., Disp: 60 g, Rfl: 4    ketoconazole (NIZORAL) 2 % shampoo, Massage into scalp 2-3 times weekly, let sit for 3-5 minutes then rinse out., Disp: 120 mL, Rfl: 4    levonorgestrel (Mirena, 52 MG,) 20 MCG/24HR IUD, 1 each by Intrauterine route 1 (One) Time., Disp: , Rfl:     levothyroxine (SYNTHROID, LEVOTHROID) 25 MCG tablet, Take 1 tablet by mouth Daily., Disp: 90 tablet, Rfl: 3     Patient denies the need for medication refills today.    OB History          2    Para   2    Term   2            AB        Living   2         SAB        IAB        Ectopic        Molar        Multiple        Live Births   2                Past Medical History:   Diagnosis Date    Anxiety 2015    celexa    Fibrocystic breast disease     Hypothyroid     IUD (intrauterine device) in place     Mirena    Obesity     somewhat overweight    Ovarian cyst     Screening breast examination     self admits    Visual impairment     contacts        Past Surgical History:   Procedure Laterality Date    AUGMENTATION MAMMAPLASTY Bilateral     silicone    BREAST SURGERY       SECTION      CHOLECYSTECTOMY  2011    laparoscopic    COSMETIC SURGERY  2013    GALLBLADDER SURGERY      INTRAUTERINE DEVICE INSERTION      OTHER SURGICAL HISTORY      IUD removal       Health Maintenance   Topic Date Due    PAP SMEAR  2022    COVID-19 Vaccine (2023- season) 2023    BMI FOLLOWUP  10/14/2023    Annual Gynecologic Pelvic and Breast Exam  2024    MAMMOGRAM  10/04/2024    ANNUAL PHYSICAL  2025    TDAP/TD VACCINES (2 - Td or Tdap) 2027    COLORECTAL CANCER SCREENING  2033    HEPATITIS C SCREENING  Completed    INFLUENZA VACCINE  Completed    Pneumococcal Vaccine 0-64  Aged Out       The additional following portions of the  "patient's history were reviewed and updated as appropriate: allergies, current medications, past family history, past medical history, past social history, past surgical history, and problem list.    Review of Systems   Constitutional: Negative.    HENT: Negative.     Eyes: Negative.    Respiratory: Negative.     Cardiovascular: Negative.    Gastrointestinal: Negative.    Endocrine: Negative.    Genitourinary: Negative.    Musculoskeletal: Negative.    Skin: Negative.    Allergic/Immunologic: Negative.    Neurological: Negative.    Hematological: Negative.    Psychiatric/Behavioral: Negative.           I have reviewed and agree with the HPI, ROS, and historical information as entered above. Kandace Vigil MD          Objective   /80   Ht 165.1 cm (65\")   Wt 86.6 kg (191 lb)   LMP 03/04/2024   BMI 31.78 kg/m²     Physical Exam  Vitals and nursing note reviewed. Exam conducted with a chaperone present.   Constitutional:       Appearance: She is well-developed.   HENT:      Head: Normocephalic and atraumatic.   Neck:      Thyroid: No thyroid mass or thyromegaly.   Cardiovascular:      Rate and Rhythm: Normal rate and regular rhythm.      Heart sounds: No murmur heard.  Pulmonary:      Effort: Pulmonary effort is normal. No retractions.      Breath sounds: Normal breath sounds. No wheezing, rhonchi or rales.   Chest:      Chest wall: No mass or tenderness.   Breasts:     Right: Normal. No mass, nipple discharge, skin change or tenderness.      Left: Normal. No mass, nipple discharge, skin change or tenderness.   Abdominal:      General: Bowel sounds are normal.      Palpations: Abdomen is soft. Abdomen is not rigid. There is no mass.      Tenderness: There is no abdominal tenderness. There is no guarding.      Hernia: No hernia is present. There is no hernia in the left inguinal area or right inguinal area.   Genitourinary:     General: Normal vulva.      Exam position: Lithotomy position.      Pubic " Area: No rash.       Labia:         Right: No rash, tenderness or lesion.         Left: No rash, tenderness or lesion.       Urethra: No urethral pain or urethral swelling.      Vagina: Normal. No vaginal discharge or lesions.      Cervix: No cervical motion tenderness, discharge, lesion or cervical bleeding.      Uterus: Normal. Not enlarged, not fixed and not tender.       Adnexa:         Right: No mass, tenderness or fullness.          Left: No mass, tenderness or fullness.        Rectum: No external hemorrhoid.      Comments: IUD strings seen at cervical os  Musculoskeletal:      Cervical back: Normal range of motion. No muscular tenderness.   Neurological:      Mental Status: She is alert and oriented to person, place, and time.   Psychiatric:         Behavior: Behavior normal.            Assessment and Plan    Problem List Items Addressed This Visit          Family History    Family history of breast cancer    Overview     And 2 maternal aunts that were diagnosed in their 60s.            Genitourinary and Reproductive     IUD (intrauterine device) in place - Primary    Overview     Mirena placed 2/25/2019.  Lot number [id number]          Other Visit Diagnoses       Women's annual routine gynecological examination        Breast cancer screening by mammogram        Relevant Orders    Mammo Screening Digital Tomosynthesis Bilateral With CAD            GYN annual well woman exam.   Pap guidelines reviewed.  Encouraged use of condoms for STD prevention.  Reviewed monthly self breast exams.  Instructed to call with lumps, pain, or breast discharge.    Ordered Mammogram today  Recommended use of Vitamin D replacement and getting adequate calcium in her diet. (1500mg)  Reviewed BMI and weight loss as preventative health measures.   Reviewed exercise as a preventative health measures.   Reccommended Flu Vaccine in Fall of each year.  Symptoms of menopausal transition reviewed with patient.   RTC in 1 year or PRN with  problems.  Return in about 1 year (around 3/18/2025) for Annual physical.     Kandace Vigil MD  03/18/2024

## 2024-03-21 ENCOUNTER — HOSPITAL ENCOUNTER (OUTPATIENT)
Dept: CARDIOLOGY | Facility: HOSPITAL | Age: 49
Discharge: HOME | End: 2024-03-21
Payer: COMMERCIAL

## 2024-03-21 VITALS — BODY MASS INDEX: 31.82 KG/M2 | WEIGHT: 191 LBS | HEIGHT: 65 IN

## 2024-03-21 DIAGNOSIS — R94.31 ABNORMAL EKG: ICD-10-CM

## 2024-03-21 LAB
BH CV STRESS BP STAGE 1: NORMAL
BH CV STRESS BP STAGE 2: NORMAL
BH CV STRESS BP STAGE 3: NORMAL
BH CV STRESS DURATION MIN STAGE 1: 3
BH CV STRESS DURATION MIN STAGE 2: 3
BH CV STRESS DURATION MIN STAGE 3: 3
BH CV STRESS DURATION MIN STAGE 4: 1
BH CV STRESS DURATION SEC STAGE 1: 0
BH CV STRESS DURATION SEC STAGE 2: 0
BH CV STRESS DURATION SEC STAGE 3: 0
BH CV STRESS DURATION SEC STAGE 4: 1
BH CV STRESS GRADE STAGE 1: 10
BH CV STRESS GRADE STAGE 2: 12
BH CV STRESS GRADE STAGE 3: 14
BH CV STRESS GRADE STAGE 4: 16
BH CV STRESS HR STAGE 1: 108
BH CV STRESS HR STAGE 2: 127
BH CV STRESS HR STAGE 3: 146
BH CV STRESS HR STAGE 4: 153
BH CV STRESS METS STAGE 1: 5
BH CV STRESS METS STAGE 2: 7.5
BH CV STRESS METS STAGE 3: 10
BH CV STRESS METS STAGE 4: 13.5
BH CV STRESS O2 STAGE 1: 99
BH CV STRESS O2 STAGE 2: 99
BH CV STRESS O2 STAGE 3: 99
BH CV STRESS O2 STAGE 4: 98
BH CV STRESS PROTOCOL 1: NORMAL
BH CV STRESS RECOVERY BP: NORMAL MMHG
BH CV STRESS RECOVERY HR: 90 BPM
BH CV STRESS RECOVERY O2: 99 %
BH CV STRESS SPEED STAGE 1: 1.7
BH CV STRESS SPEED STAGE 2: 2.5
BH CV STRESS SPEED STAGE 3: 3.4
BH CV STRESS SPEED STAGE 4: 4.2
BH CV STRESS STAGE 1: 1
BH CV STRESS STAGE 2: 2
BH CV STRESS STAGE 3: 3
BH CV STRESS STAGE 4: 4
MAXIMAL PREDICTED HEART RATE: 174 BPM
PERCENT MAX PREDICTED HR: 89.08 %
STRESS BASELINE BP: NORMAL MMHG
STRESS BASELINE HR: 68 BPM
STRESS O2 SAT REST: 100 %
STRESS PERCENT HR: 105 %
STRESS POST ESTIMATED WORKLOAD: 11.7 METS
STRESS POST EXERCISE DUR MIN: 10 MIN
STRESS POST EXERCISE DUR SEC: 1 SEC
STRESS POST O2 SAT PEAK: 99 %
STRESS POST PEAK BP: NORMAL MMHG
STRESS POST PEAK HR: 155 BPM
STRESS TARGET HR: 148 BPM

## 2024-03-21 PROCEDURE — 93017 CV STRESS TEST TRACING ONLY: CPT

## 2024-06-05 DIAGNOSIS — E03.9 HYPOTHYROIDISM, UNSPECIFIED TYPE: ICD-10-CM

## 2024-06-05 DIAGNOSIS — Z00.00 ROUTINE GENERAL MEDICAL EXAMINATION AT A HEALTH CARE FACILITY: ICD-10-CM

## 2024-06-05 RX ORDER — CITALOPRAM 20 MG/1
20 TABLET ORAL DAILY
Qty: 90 TABLET | Refills: 3 | Status: SHIPPED | OUTPATIENT
Start: 2024-06-05

## 2024-06-05 RX ORDER — LEVOTHYROXINE SODIUM 0.03 MG/1
25 TABLET ORAL DAILY
Qty: 90 TABLET | Refills: 3 | Status: SHIPPED | OUTPATIENT
Start: 2024-06-05

## 2024-08-07 ENCOUNTER — OFFICE VISIT (OUTPATIENT)
Dept: OBSTETRICS AND GYNECOLOGY | Facility: CLINIC | Age: 49
End: 2024-08-07
Payer: COMMERCIAL

## 2024-08-07 VITALS
BODY MASS INDEX: 32.76 KG/M2 | SYSTOLIC BLOOD PRESSURE: 128 MMHG | WEIGHT: 196.6 LBS | HEIGHT: 65 IN | DIASTOLIC BLOOD PRESSURE: 76 MMHG

## 2024-08-07 DIAGNOSIS — N90.89 VULVAR LESION: ICD-10-CM

## 2024-08-07 DIAGNOSIS — Z30.433 ENCOUNTER FOR IUD REMOVAL AND REINSERTION: Primary | ICD-10-CM

## 2024-08-07 RX ORDER — SULFAMETHOXAZOLE AND TRIMETHOPRIM 800; 160 MG/1; MG/1
1 TABLET ORAL 2 TIMES DAILY
Qty: 14 TABLET | Refills: 0 | Status: SHIPPED | OUTPATIENT
Start: 2024-08-07 | End: 2024-08-14

## 2024-08-07 NOTE — PROGRESS NOTES
Gynecologic Procedure Note        Procedure: IUD Removal and Reinsertion Procedure Note     Procedures    Pre procedure indication     1) Desires Removal of IUD  2) Desires Mirena  Post procedure indication   1) Desires Removal of IUD  2) Desires Mirena              NDC: Mirena 55709-863-67  Lot #: [id number]  Exp Date: 2026/AUG  BH device                   The patient presents today for removal of her IUD.  She requests removal of her current IUD and immediate placement of another IUD.  The risks, benefits, and alternatives to Mirena were explained at length with the patient. All her questions were answered and consents were signed.  Her LMP was absent secondary to Mirena. She has been having irregular spotting for past couple months. Last vaginal bleeding was early last week.   Urine Pregnancy Test was Not done.  Patient does not have an allergy to betadine or shellfish.    Time out: immediate members of the procedure team and patient agree to the following: correct patient, correct site, correct procedure to be performed. TRAVIS Chaney      The patient was placed in a dorsal lithotomy position on the examining table in [facility]. A bimanual exam confirmed the uterus was midposition. A speculum was inserted into the vagina and the cervix was brought into view.  An IUD string was visualized.  The string was then grasped and removed intact with gentle traction.  There was a Minimal amount of bleeding and cramping.    The cervix was then prepped with Betadine. The anterior lip of the cervix was then grasped with a single-tooth tenaculum. The endometrial cavity was then sounded to 9 centimeters. The sealed Mirena package was opened and the IUD was removed in a sterile fashion.    The upper edge of the depth setting the flange was set at the uterine sound measurement. The  was then carefully advanced to the cervical canal into the uterus to the level of the fundus.  The slider was then retracted about  1 cm and deployed the device. The device was then gently advanced to the fundus. The IUD was then released by pulling the slider down all the way. The  was removed carefully from the uterus. The threads were then cut leaving 2-3 cm visible outside of the cervix.  The single-tooth tenaculum was removed from the anterior lip. Good hemostasis was noted.   All other instruments were removed from the vagina.       The patient tolerated the procedure very well with a mild amount of discomfort.  She was monitored for 5 minutes prior to discharge.      There were no complications.    The patient was counseled about the need to return in 4 weeks for IUD check.     She was counseled about the need to use a backup method of contraception such as condoms until her post insertion exam was performed. The patient verbalized understanding when the IUD will need to be removed/replaced. Written information was given to the patient.  The patient is counseled to contact us if she has any significant or increasing bleeding, pain, fever, chills, or other concerns. She is instructed to see a doctor right away if she believes that she may be pregnant at any time with the IUD in place.    Return in about 1 month (around 9/7/2024) for Ultrasound- IUD check.     Vulvar lesion noted on right labia. Lesion with black head and subcutaneous induration palpated. Lesion approx 1cm. Non tender. Lesion has been present for approx 3 weeks per patient. Lesion cleansed with Betadine. Purulent discharge expressed and cultured. Antibiotic prescribed. Patient advised to call for s/sx of infection. Will reassess lesion at f/u appt in 1 month.  Abscess education included in patient instructions.     Ирина Almeida, TRAVIS  08/07/2024

## 2024-08-08 ENCOUNTER — TELEPHONE (OUTPATIENT)
Dept: OBSTETRICS AND GYNECOLOGY | Facility: CLINIC | Age: 49
End: 2024-08-08
Payer: COMMERCIAL

## 2024-08-08 NOTE — TELEPHONE ENCOUNTER
Chun from [facility] has 2 swabs for this patient, she wanting to know are the swabs the same.    Chun's contact number [phone]

## 2024-08-08 NOTE — TELEPHONE ENCOUNTER
Attempted to return call to Chun with [facility].   Left voice message on her secure line informing her that the provider typically collects 2 specimens from the same source when ordering both aerobic and anaerobic cultures. Both swabs are from the same source and should be labeled appropriately. Requested she call with any further questions.

## 2024-08-11 LAB
BACTERIA SPEC AEROBE CULT: NORMAL
BACTERIA SPEC CULT: NORMAL

## 2024-08-13 LAB
BACTERIA SPEC AEROBE CULT: NORMAL
BACTERIA SPEC ANAEROBE CULT: NORMAL
BACTERIA SPEC CULT: NORMAL
BACTERIA SPEC CULT: NORMAL

## 2024-09-04 ENCOUNTER — OFFICE VISIT (OUTPATIENT)
Dept: OBSTETRICS AND GYNECOLOGY | Facility: CLINIC | Age: 49
End: 2024-09-04
Payer: COMMERCIAL

## 2024-09-04 VITALS
WEIGHT: 200 LBS | HEIGHT: 65 IN | BODY MASS INDEX: 33.32 KG/M2 | SYSTOLIC BLOOD PRESSURE: 102 MMHG | DIASTOLIC BLOOD PRESSURE: 68 MMHG

## 2024-09-04 DIAGNOSIS — Z30.431 IUD CHECK UP: Primary | ICD-10-CM

## 2024-09-04 PROCEDURE — 99212 OFFICE O/P EST SF 10 MIN: CPT

## 2024-09-04 NOTE — PROGRESS NOTES
"    Chief Complaint   Patient presents with    Mirena Check         Subjective   HPI  Valery Soriano is a 48 y.o. female, , who presents for IUD check follow up.  She had a Mirena placed on 24. Since the IUD placement, the patient reports no complaints . She has not had a period since the IUD was placed.    The additional following portions of the patient's history were reviewed and updated as appropriate: allergies, current medications, past family history, past medical history, past social history, past surgical history, and problem list.    Did the patient have u/s today? Yes.  Findings showed IUD appears to be in correct placement.  I have personally evaluated the U/S and agree with the findings.     Review of Systems   Respiratory: Negative.  Negative for shortness of breath.    Cardiovascular: Negative.  Negative for chest pain.   Gastrointestinal: Negative.  Negative for abdominal distention, abdominal pain and constipation.   Genitourinary:  Negative for dyspareunia, dysuria, pelvic pain, pelvic pressure, vaginal bleeding, vaginal discharge and vaginal pain.     All other systems reviewed and are negative.     I have reviewed and agree with the HPI, ROS, and historical information as entered above. Ирина Almeida, APRN      Objective   /68   Ht 165.1 cm (65\")   Wt 90.7 kg (200 lb)   LMP 2024 (Approximate)   BMI 33.28 kg/m²     Physical Exam  Vitals and nursing note reviewed.   Constitutional:       General: She is not in acute distress.     Appearance: Normal appearance. She is not ill-appearing or toxic-appearing.   HENT:      Head: Normocephalic and atraumatic.   Pulmonary:      Effort: Pulmonary effort is normal.   Abdominal:      Palpations: Abdomen is soft. There is no mass.      Tenderness: There is no abdominal tenderness.      Hernia: No hernia is present.   Neurological:      Mental Status: She is alert and oriented to person, place, and time.   Psychiatric:         Mood " and Affect: Mood normal.         Behavior: Behavior normal.         Assessment & Plan     Assessment     Problem List Items Addressed This Visit       IUD (intrauterine device) in place - Primary    Overview     Mirena placed 2/25/2019.  Lot number [id number]            TVUS showed correct placement of IUD. Cervix small amt of fluid present measuring 5mm, appears to contain a protrusion measuring [id number]. L ovarian follicle. Asymptomatic.   US results reviewed with patient. Patient informed Dr Vigil will review US and patient will be notified if there are any concerns.   IUD education included in patient instructions.  Return in about 11 months (around 8/8/2025) for Annual physical or sooner if needed.      Ирина Almeida, APRN  09/04/2024

## 2024-09-09 DIAGNOSIS — N84.0 ENDOMETRIAL POLYP: Primary | ICD-10-CM

## 2024-09-09 PROBLEM — N84.1 CERVICAL POLYP: Status: ACTIVE | Noted: 2024-09-09

## 2024-09-24 LAB
NCCN CRITERIA FLAG: NORMAL
TYRER CUZICK SCORE: 8

## 2024-10-07 ENCOUNTER — HOSPITAL ENCOUNTER (OUTPATIENT)
Facility: HOSPITAL | Age: 49
Discharge: HOME | End: 2024-10-07
Admitting: OBSTETRICS & GYNECOLOGY
Payer: COMMERCIAL

## 2024-10-07 DIAGNOSIS — Z12.31 BREAST CANCER SCREENING BY MAMMOGRAM: ICD-10-CM

## 2024-10-07 PROCEDURE — 77067 SCR MAMMO BI INCL CAD: CPT | Performed by: RADIOLOGY

## 2024-10-07 PROCEDURE — 77067 SCR MAMMO BI INCL CAD: CPT

## 2024-10-07 PROCEDURE — 77063 BREAST TOMOSYNTHESIS BI: CPT

## 2024-10-07 PROCEDURE — 77063 BREAST TOMOSYNTHESIS BI: CPT | Performed by: RADIOLOGY

## 2024-12-04 ENCOUNTER — OFFICE VISIT (OUTPATIENT)
Dept: OBSTETRICS AND GYNECOLOGY | Facility: CLINIC | Age: 49
End: 2024-12-04
Payer: COMMERCIAL

## 2024-12-04 VITALS — BODY MASS INDEX: 29.32 KG/M2 | DIASTOLIC BLOOD PRESSURE: 78 MMHG | WEIGHT: 176.2 LBS | SYSTOLIC BLOOD PRESSURE: 110 MMHG

## 2024-12-04 DIAGNOSIS — Z12.4 SCREENING FOR CERVICAL CANCER: Primary | ICD-10-CM

## 2024-12-04 DIAGNOSIS — N84.1 CERVICAL POLYP: ICD-10-CM

## 2024-12-04 DIAGNOSIS — N89.8 VAGINAL DISCHARGE: ICD-10-CM

## 2024-12-04 DIAGNOSIS — Z97.5 IUD (INTRAUTERINE DEVICE) IN PLACE: ICD-10-CM

## 2024-12-04 NOTE — PROGRESS NOTES
Chief Complaint   Patient presents with    Follow-up     And pap smear    Cervical Polyp       Subjective   HPI  Valery Soriano is a 49 y.o. female, .  No LMP recorded. Patient has had an implant.. who presents for follow up on cervical polyp.      At her last visit 2024 US was performed to confirm correct IUD placement. Incidental finding- Small amount of fluid present measuring 5 mm, appears to contain a protrusion measuring 9 x 3 x 5 mm. She was asymptomatic. Plan was to follow up in 3 months to reassess cervical polyp. Since then she has continued to be asymptomatic. The patient reports additional symptoms as none.      US performed today: Showed correct IUD placement. Nabothian cyst noted, probable polyp previously seen today with stable siz/appearance, however in aye views measurements are up to 1.4cm in length. I have reviewed the preliminary US report. Final report pending physician review. Ирина Almeida, APRN      Additional OB/GYN History     Last Pap : 21 wnl and hpv negative  Last Completed Pap Smear       This patient has no relevant Health Maintenance data.            Last mammogram: 10/07/24 wnl  Last Completed Mammogram            MAMMOGRAM (Every 2 Years) Next due on 10/7/2026      10/07/2024  Mammo Screening Digital Tomosynthesis Bilateral With CAD    10/04/2023  Mammo Screening Digital Tomosynthesis Bilateral With CAD    10/04/2022  Mammo Screening Digital Tomosynthesis Bilateral With CAD    2022  Mammo Diagnostic Digital Tomosynthesis Left With CAD    2021  Mammo Screening Digital Tomosynthesis Bilateral With CAD    Only the first 5 history entries have been loaded, but more history exists.                    Tobacco Usage?: No   OB History          2    Para   2    Term   2            AB        Living   2         SAB        IAB        Ectopic        Molar        Multiple        Live Births   2                  Current Outpatient  Medications:     citalopram (CeleXA) 20 MG tablet, Take 1 tablet by mouth Daily., Disp: 90 tablet, Rfl: 3    ketoconazole (NIZORAL) 2 % cream, Apply to affected areas on face two times daily when flared., Disp: 60 g, Rfl: 4    ketoconazole (NIZORAL) 2 % shampoo, Massage into scalp 2-3 times weekly, let sit for 3-5 minutes then rinse out., Disp: 120 mL, Rfl: 4    levonorgestrel (Mirena, 52 MG,) 20 MCG/24HR IUD, 1 each by Intrauterine route 1 (One) Time., Disp: , Rfl:     levothyroxine (SYNTHROID, LEVOTHROID) 25 MCG tablet, Take 1 tablet by mouth Daily., Disp: 90 tablet, Rfl: 3     Past Medical History:   Diagnosis Date    Abnormal ECG     Anxiety 2015    celexa    Fibrocystic breast disease     Hypothyroid     IUD (intrauterine device) in place     Mirena    Obesity     somewhat overweight    Ovarian cyst     PONV (postoperative nausea and vomiting)     Screening breast examination     self admits    Visual impairment     contacts        Past Surgical History:   Procedure Laterality Date    AUGMENTATION MAMMAPLASTY Bilateral     silicone    BREAST SURGERY       SECTION      CHOLECYSTECTOMY  2011    laparoscopic    COSMETIC SURGERY  2013    GALLBLADDER SURGERY      INTRAUTERINE DEVICE INSERTION      LAPAROSCOPIC CHOLECYSTECTOMY      OTHER SURGICAL HISTORY      IUD removal    WISDOM TOOTH EXTRACTION      Right side       The additional following portions of the patient's history were reviewed and updated as appropriate: allergies, current medications, past family history, past medical history, past social history, past surgical history, and problem list.    Review of Systems   Respiratory: Negative.  Negative for shortness of breath.    Cardiovascular: Negative.  Negative for chest pain.   Gastrointestinal: Negative.  Negative for abdominal distention and abdominal pain.   Genitourinary: Negative.  Negative for dyspareunia, pelvic pain, pelvic pressure, vaginal bleeding, vaginal discharge and  vaginal pain.       I have reviewed and agree with the HPI, ROS, and historical information as entered above. Ирина Almeida, APRN      Objective   /78   Wt 79.9 kg (176 lb 3.2 oz)   BMI 29.32 kg/m²     Physical Exam  Vitals and nursing note reviewed. Exam conducted with a chaperone present.   Constitutional:       General: She is not in acute distress.     Appearance: Normal appearance. She is not ill-appearing or toxic-appearing.   HENT:      Head: Normocephalic and atraumatic.   Pulmonary:      Effort: Pulmonary effort is normal.   Abdominal:      Palpations: Abdomen is soft. There is no mass.      Tenderness: There is no abdominal tenderness.      Hernia: No hernia is present.   Genitourinary:     General: Normal vulva.      Labia:         Right: No rash, tenderness, lesion or injury.         Left: No rash, tenderness, lesion or injury.       Vagina: No signs of injury. Vaginal discharge present. No erythema, tenderness, bleeding, lesions or prolapsed vaginal walls.      Cervix: Discharge present. No cervical motion tenderness, friability, lesion, erythema or cervical bleeding.      Uterus: Normal. Not enlarged and not tender.       Adnexa: Right adnexa normal.        Right: No mass, tenderness or fullness.          Left: No mass, tenderness or fullness.              Comments: White curd like vaginal discharge noted.   Neurological:      Mental Status: She is alert and oriented to person, place, and time.   Psychiatric:         Mood and Affect: Mood normal.         Behavior: Behavior normal.         Assessment & Plan     Assessment     Problem List Items Addressed This Visit       Cervical polyp    Overview     9/4/24- Cervix noted to have nabothian cyst with a small protrusion likely representing a cervical polyp measuring 9 x 3 x 5 mm.  Recommend 3-month follow-up    12/4/2024- Nabothian cyst noted, probable polyp previously seen today with stable siz/appearance, however in aye views measurements are  up to 1.4cm in length. Asymptomatic. Potential etiologies and treatment options reviewed. Informed cervical polyps are almost always benign but are checked for cancer if removed. Discussed potential cancerous lesion with any growth; however, very low suspicion. We will repeat US in 3 months. If stable, will f/u if symptomatic.          IUD (intrauterine device) in place    Overview     Mirena placed 2/25/2019.  Lot number [id number]          Other Visit Diagnoses       Screening for cervical cancer    -  Primary    Relevant Orders    LIQUID-BASED PAP SMEAR WITH HPV GENOTYPING REGARDLESS OF INTERPRETATION ([facility])    Vaginal discharge        Relevant Orders    LIQUID-BASED PAP SMEAR WITH HPV GENOTYPING REGARDLESS OF INTERPRETATION ([facility])          Plan:   Cervical polyp appears stable, but measuring approx 0.5cm larger. Asymptomatic. See plan above.   Pap guidelines reviewed. Last pap 2021. Pap performed. Vaginal discharge. Asymptomatic. BV and yeast added. Will treat if indicated.   Cervical polyp education included in patient instructions.   Return in about 3 months (around 3/4/2025) for Ultrasound- f/u cervical polyp.        Ирина Almeida, TRAVIS  12/04/2024

## 2024-12-10 LAB — REF LAB TEST METHOD: NORMAL

## 2025-03-03 DIAGNOSIS — N84.1 CERVICAL POLYP: Primary | ICD-10-CM

## 2025-03-04 ENCOUNTER — OFFICE VISIT (OUTPATIENT)
Dept: OBSTETRICS AND GYNECOLOGY | Facility: CLINIC | Age: 50
End: 2025-03-04
Payer: COMMERCIAL

## 2025-03-04 VITALS
WEIGHT: 165 LBS | SYSTOLIC BLOOD PRESSURE: 100 MMHG | BODY MASS INDEX: 27.49 KG/M2 | DIASTOLIC BLOOD PRESSURE: 62 MMHG | HEIGHT: 65 IN

## 2025-03-04 DIAGNOSIS — N84.1 CERVICAL POLYP: ICD-10-CM

## 2025-03-04 DIAGNOSIS — Z80.3 FAMILY HISTORY OF BREAST CANCER: ICD-10-CM

## 2025-03-04 DIAGNOSIS — Z97.5 IUD (INTRAUTERINE DEVICE) IN PLACE: Primary | ICD-10-CM

## 2025-03-04 NOTE — PROGRESS NOTES
"            Chief Complaint   Patient presents with    Follow-up       Subjective   HPI  Valery Soriano is a 49 y.o. female, . Her last LMP was No LMP recorded (lmp unknown). Patient has had an implant.. who presents for follow up on cervical polyp.      Pt was last seen on 2024 by TRAVIS Saul. US was done.   \"24- Cervix noted to have nabothian cyst with a small protrusion likely representing a cervical polyp measuring 9 x 3 x 5 mm.  Recommend 3-month follow-up     2024- Nabothian cyst noted, probable polyp previously seen today with stable siz/appearance, however in aye views measurements are up to 1.4cm in length. Asymptomatic. Potential etiologies and treatment options reviewed. Informed cervical polyps are almost always benign but are checked for cancer if removed. Discussed potential cancerous lesion with any growth; however, very low suspicion. We will repeat US in 3 months. If stable, will f/u if symptomatic.\"    At her last visit she was treated with expectant management. The patient reports additional symptoms as none.        Additional OB/GYN History     Last Pap : 2024  Last Completed Pap Smear            PAP SMEAR (Yearly) Next due on 2024  LIQUID-BASED PAP SMEAR WITH HPV GENOTYPING REGARDLESS OF INTERPRETATION ([facility])    2021  SCANNED - PAP SMEAR    10/15/2020  Pap IG, Rfx HPV ASCU    10/05/2020  SCANNED - PAP SMEAR    2019  Done - Dr adams    Only the first 5 history entries have been loaded, but more history exists.                    Last mammogram: 10/7/2024  Last Completed Mammogram            MAMMOGRAM (Every 2 Years) Next due on 10/7/2026      10/07/2024  Mammo Screening Digital Tomosynthesis Bilateral With CAD    10/04/2023  Mammo Screening Digital Tomosynthesis Bilateral With CAD    10/04/2022  Mammo Screening Digital Tomosynthesis Bilateral With CAD    2022  Mammo Diagnostic Digital Tomosynthesis Left With CAD    " "2021  Mammo Screening Digital Tomosynthesis Bilateral With CAD    Only the first 5 history entries have been loaded, but more history exists.                    Tobacco Usage?: No   OB History          2    Para   2    Term   2            AB        Living   2         SAB        IAB        Ectopic        Molar        Multiple        Live Births   2            Biopsy Cervix    Date/Time: 3/4/2025 12:20 PM    Performed by: Kandace Vigil MD  Authorized by: Kandace Vigil MD  Preparation: Patient was prepped and draped in the usual sterile fashion.  Local anesthesia used: no    Anesthesia:  Local anesthesia used: no    Sedation:  Patient sedated: no    Patient tolerance: patient tolerated the procedure well with no immediate complications          Procedure Details   The risks and benefits of the procedure and Verbal informed consent obtained. Time out: immediate members of the procedure team and patient agree to the following: correct patient, correct site, correct procedure to be performed. Kandace Vigil MD      She was positioned in the dorsal lithotomy position and a speculum was inserted into the vagina and excellent visualization of cervix achieved, cervix swabbed with Betadine.   A single-tooth tenaculum was placed in the anterior lip of the cervix.  Cervical stenosis noted.  Serial dilation was performed.  An endocervical curettage was performed.  Polyp could be palpated at approximately the 10 o'clock position inside the cervix.  Patient tolerated procedure well      Findings:  The procedure was notable for:  Physical Exam  /62   Ht 165.1 cm (65\")   Wt 74.8 kg (165 lb)   LMP  (LMP Unknown)   BMI 27.46 kg/m²       Specimens: Endocervical curettings  Specimens labelled and sent to Pathology.    Complications: none.    The patient tolerated the procedure well and with moderate discomfort and bleeding.      Current Outpatient Medications:     citalopram (CeleXA) " 20 MG tablet, Take 1 tablet by mouth Daily., Disp: 90 tablet, Rfl: 3    ketoconazole (NIZORAL) 2 % cream, Apply to affected areas on face two times daily when flared., Disp: 60 g, Rfl: 4    ketoconazole (NIZORAL) 2 % shampoo, Massage into scalp 2-3 times weekly, let sit for 3-5 minutes then rinse out., Disp: 120 mL, Rfl: 4    levonorgestrel (Mirena, 52 MG,) 20 MCG/24HR IUD, 1 each by Intrauterine route 1 (One) Time., Disp: , Rfl:     levothyroxine (SYNTHROID, LEVOTHROID) 25 MCG tablet, Take 1 tablet by mouth Daily., Disp: 90 tablet, Rfl: 3     Past Medical History:   Diagnosis Date    Abnormal ECG     Anxiety 2015    celexa    Fibrocystic breast disease     Hypothyroid     IUD (intrauterine device) in place     Mirena    Obesity     somewhat overweight    Ovarian cyst     PONV (postoperative nausea and vomiting)     Screening breast examination     self admits    Visual impairment     contacts        Past Surgical History:   Procedure Laterality Date    AUGMENTATION MAMMAPLASTY Bilateral     silicone    BREAST SURGERY       SECTION      CHOLECYSTECTOMY  2011    laparoscopic    COSMETIC SURGERY  2013    GALLBLADDER SURGERY      INTRAUTERINE DEVICE INSERTION      LAPAROSCOPIC CHOLECYSTECTOMY      OTHER SURGICAL HISTORY      IUD removal    WISDOM TOOTH EXTRACTION      Right side       The additional following portions of the patient's history were reviewed and updated as appropriate: allergies, current medications, past family history, past medical history, past social history, past surgical history, and problem list.    Review of Systems   Constitutional: Negative.    HENT: Negative.     Eyes: Negative.    Respiratory: Negative.     Cardiovascular: Negative.    Gastrointestinal: Negative.    Endocrine: Negative.    Genitourinary: Negative.    Musculoskeletal: Negative.    Skin: Negative.    Allergic/Immunologic: Negative.    Neurological: Negative.    Hematological: Negative.   "  Psychiatric/Behavioral: Negative.         I have reviewed and agree with the HPI, ROS, and historical information as entered above. Kandace Vigil MD      Objective   /62   Ht 165.1 cm (65\")   Wt 74.8 kg (165 lb)   LMP  (LMP Unknown)   BMI 27.46 kg/m²     Physical Exam  Vitals and nursing note reviewed. Exam conducted with a chaperone present.   Genitourinary:     General: Normal vulva.      Exam position: Lithotomy position.      Labia:         Right: No rash, tenderness or lesion.         Left: No rash, tenderness or lesion.       Urethra: No urethral pain, urethral swelling or urethral lesion.      Vagina: Normal. No tenderness or lesions.      Cervix: No cervical motion tenderness, discharge, lesion or cervical bleeding.      Uterus: Normal. Not enlarged, not fixed and not tender.       Adnexa:         Right: No mass, tenderness or fullness.          Left: No mass, tenderness or fullness.        Rectum: No external hemorrhoid.            Comments: Chaperone Present.  IUD string seen 1 cm from cervical os.          Assessment & Plan     Assessment     Problem List Items Addressed This Visit          Family History    Family history of breast cancer    Overview     And 2 maternal aunts that were diagnosed in their 60s.            Genitourinary and Reproductive     IUD (intrauterine device) in place - Primary    Overview       Mirena IUD removal and reinsertion 8/7/2024         Cervical polyp    Overview     9/4/24- Cervix noted to have nabothian cyst with a small protrusion likely representing a cervical polyp measuring 9 x 3 x 5 mm.  Recommend 3-month follow-up    12/4/2024- Nabothian cyst noted, probable polyp previously seen today with stable siz/appearance, however in aye views measurements are up to 1.4cm in length. Asymptomatic. Potential etiologies and treatment options reviewed. Informed cervical polyps are almost always benign but are checked for cancer if removed. Discussed potential " cancerous lesion with any growth; however, very low suspicion. We will repeat US in 3 months. If stable, will f/u if symptomatic.     3/4/2025-  Normal uterine shape and size.  IUD noted approximately 7 mm from the fundus.  Ovaries within normal limits with no free fluid noted.  Cervical polyp still visualized measuring approximately 4 x 3 x 6 mm.  ECC/cervical polyp performed.         Relevant Orders    US Non-ob Transvaginal           Plan     All questions answered.  Diagnosis explained in detail, including differential.  Endocervical curettage.  Return in about 6 months (around 9/4/2025) for Annual physical, ultrasound.    Discussed all treatment options.  Patient elects for ECC for tissue evaluation.  If tissue evaluation is within normal limits and benign, patient would like to repeat the ultrasound in 6 months.  If there is a concern for abnormal cellularity, we will proceed with surgical management.    Kandace Vigil MD  03/04/2025

## 2025-03-05 LAB — REF LAB TEST METHOD: NORMAL

## 2025-03-31 ENCOUNTER — OFFICE VISIT (OUTPATIENT)
Dept: FAMILY MEDICINE CLINIC | Facility: CLINIC | Age: 50
End: 2025-03-31
Payer: COMMERCIAL

## 2025-03-31 VITALS
OXYGEN SATURATION: 96 % | HEIGHT: 65 IN | WEIGHT: 166.4 LBS | SYSTOLIC BLOOD PRESSURE: 102 MMHG | BODY MASS INDEX: 27.72 KG/M2 | DIASTOLIC BLOOD PRESSURE: 64 MMHG | RESPIRATION RATE: 14 BRPM | TEMPERATURE: 98.1 F | HEART RATE: 75 BPM

## 2025-03-31 DIAGNOSIS — Z00.00 ROUTINE GENERAL MEDICAL EXAMINATION AT A HEALTH CARE FACILITY: Primary | ICD-10-CM

## 2025-03-31 PROCEDURE — 99396 PREV VISIT EST AGE 40-64: CPT | Performed by: PHYSICIAN ASSISTANT

## 2025-03-31 NOTE — PROGRESS NOTES
Subjective   Valery Soriano is a 49 y.o. female  Annual Exam (UTD on MMG, colonoscopy )      History of Present Illness  History of Present Illness  Patient is a very pleasant 49-year-old female who comes in for preventive medical examination denies any problems     The following portions of the patient's history were reviewed and updated as appropriate: allergies, current medications, past social history and problem list    Review of Systems   Constitutional: Negative.    HENT: Negative.     Eyes: Negative.    Respiratory: Negative.     Cardiovascular: Negative.    Gastrointestinal: Negative.    Endocrine: Negative.    Genitourinary: Negative.    Musculoskeletal: Negative.    Skin: Negative.    Allergic/Immunologic: Negative.    Neurological: Negative.    Hematological: Negative.    Psychiatric/Behavioral: Negative.     All other systems reviewed and are negative.      Objective     Vitals:    03/31/25 1443   BP: 102/64   Pulse: 75   Resp: 14   Temp: 98.1 °F (36.7 °C)   SpO2: 96%       Physical Exam  Vitals and nursing note reviewed.   Constitutional:       General: She is not in acute distress.     Appearance: Normal appearance. She is well-developed. She is not ill-appearing, toxic-appearing or diaphoretic.   HENT:      Head: Normocephalic and atraumatic.      Right Ear: External ear normal.      Left Ear: External ear normal.   Eyes:      Conjunctiva/sclera: Conjunctivae normal.      Pupils: Pupils are equal, round, and reactive to light.   Neck:      Thyroid: No thyromegaly.      Vascular: No carotid bruit or JVD.   Cardiovascular:      Rate and Rhythm: Normal rate and regular rhythm.      Pulses: Normal pulses.      Heart sounds: Normal heart sounds. No murmur heard.  Pulmonary:      Effort: Pulmonary effort is normal. No respiratory distress.      Breath sounds: Normal breath sounds.   Abdominal:      General: Bowel sounds are normal.      Palpations: Abdomen is soft. There is no mass.      Tenderness: There is  no abdominal tenderness.   Musculoskeletal:         General: No swelling. Normal range of motion.      Cervical back: Normal range of motion and neck supple.   Lymphadenopathy:      Cervical: No cervical adenopathy.   Skin:     General: Skin is warm and dry.      Findings: No lesion or rash.   Neurological:      Mental Status: She is alert and oriented to person, place, and time.      Cranial Nerves: No cranial nerve deficit.      Sensory: No sensory deficit.      Motor: No weakness.      Coordination: Coordination normal.      Gait: Gait normal.      Deep Tendon Reflexes: Reflexes are normal and symmetric.   Psychiatric:         Mood and Affect: Mood normal.         Behavior: Behavior normal.         Thought Content: Thought content normal.         Judgment: Judgment normal.       Physical Exam      Assessment & Plan   Assessment & Plan      Diagnoses and all orders for this visit:    1. Routine general medical examination at a health care facility (Primary)  -     Comprehensive Metabolic Panel; Future  -     Lipid Panel; Future  -     Cancel: TSH; Future  -     CBC (No Diff); Future  -     TSH; Future  -     Hemoglobin A1c; Future     Preventive medicine discussed, diet, exercise, healthy living discussed at length.  Discussed nutrition, physical activity, healthy weight, injury prevention, misuse of tobacco, alcohol and drugs, dental health, mental health, immunizations, screening    Part of this note may be an electronic transcription/translation of spoken language to printed text using the Dragon Dictation System.       Patient or patient representative verbalized consent for the use of Ambient Listening during the visit with  OCTAVIO Driscoll for chart documentation. 3/31/2025  15:08 EDT

## 2025-04-01 ENCOUNTER — LAB (OUTPATIENT)
Facility: HOSPITAL | Age: 50
End: 2025-04-01
Payer: COMMERCIAL

## 2025-04-01 DIAGNOSIS — Z00.00 ROUTINE GENERAL MEDICAL EXAMINATION AT A HEALTH CARE FACILITY: ICD-10-CM

## 2025-04-01 LAB
ALBUMIN SERPL-MCNC: 4.3 G/DL (ref 3.5–5.2)
ALBUMIN/GLOB SERPL: 2 G/DL
ALP SERPL-CCNC: 80 U/L (ref 39–117)
ALT SERPL W P-5'-P-CCNC: 17 U/L (ref 1–33)
ANION GAP SERPL CALCULATED.3IONS-SCNC: 9.5 MMOL/L (ref 5–15)
AST SERPL-CCNC: 20 U/L (ref 1–32)
BILIRUB SERPL-MCNC: 0.5 MG/DL (ref 0–1.2)
BUN SERPL-MCNC: 12 MG/DL (ref 6–20)
BUN/CREAT SERPL: 13.8 (ref 7–25)
CALCIUM SPEC-SCNC: 9.2 MG/DL (ref 8.6–10.5)
CHLORIDE SERPL-SCNC: 104 MMOL/L (ref 98–107)
CHOLEST SERPL-MCNC: 178 MG/DL (ref 0–200)
CO2 SERPL-SCNC: 25.5 MMOL/L (ref 22–29)
CREAT SERPL-MCNC: 0.87 MG/DL (ref 0.57–1)
DEPRECATED RDW RBC AUTO: 42.1 FL (ref 37–54)
EGFRCR SERPLBLD CKD-EPI 2021: 82.8 ML/MIN/1.73
ERYTHROCYTE [DISTWIDTH] IN BLOOD BY AUTOMATED COUNT: 12.7 % (ref 12.3–15.4)
GLOBULIN UR ELPH-MCNC: 2.2 GM/DL
GLUCOSE SERPL-MCNC: 91 MG/DL (ref 65–99)
HBA1C MFR BLD: 5 % (ref 4.8–5.6)
HCT VFR BLD AUTO: 39.4 % (ref 34–46.6)
HDLC SERPL-MCNC: 73 MG/DL (ref 40–60)
HGB BLD-MCNC: 13.8 G/DL (ref 12–15.9)
LDLC SERPL CALC-MCNC: 91 MG/DL (ref 0–100)
LDLC/HDLC SERPL: 1.23 {RATIO}
MCH RBC QN AUTO: 31.8 PG (ref 26.6–33)
MCHC RBC AUTO-ENTMCNC: 35 G/DL (ref 31.5–35.7)
MCV RBC AUTO: 90.8 FL (ref 79–97)
PLATELET # BLD AUTO: 321 10*3/MM3 (ref 140–450)
PMV BLD AUTO: 11.2 FL (ref 6–12)
POTASSIUM SERPL-SCNC: 3.8 MMOL/L (ref 3.5–5.2)
PROT SERPL-MCNC: 6.5 G/DL (ref 6–8.5)
RBC # BLD AUTO: 4.34 10*6/MM3 (ref 3.77–5.28)
SODIUM SERPL-SCNC: 139 MMOL/L (ref 136–145)
TRIGL SERPL-MCNC: 76 MG/DL (ref 0–150)
TSH SERPL DL<=0.05 MIU/L-ACNC: 3.63 UIU/ML (ref 0.27–4.2)
VLDLC SERPL-MCNC: 14 MG/DL (ref 5–40)
WBC NRBC COR # BLD AUTO: 9.51 10*3/MM3 (ref 3.4–10.8)

## 2025-04-01 PROCEDURE — 80050 GENERAL HEALTH PANEL: CPT

## 2025-04-01 PROCEDURE — 80061 LIPID PANEL: CPT

## 2025-04-01 PROCEDURE — 83036 HEMOGLOBIN GLYCOSYLATED A1C: CPT

## 2025-04-01 PROCEDURE — 36415 COLL VENOUS BLD VENIPUNCTURE: CPT

## 2025-04-03 ENCOUNTER — RESULTS FOLLOW-UP (OUTPATIENT)
Dept: FAMILY MEDICINE CLINIC | Facility: CLINIC | Age: 50
End: 2025-04-03
Payer: COMMERCIAL

## 2025-04-03 NOTE — LETTER
Valery Soriano  [address] [address]    April 3, 2025     Dear Ms. Soriano:    Below are the results from your recent visit:    Resulted Orders   Comprehensive Metabolic Panel   Result Value Ref Range    Glucose 91 65 - 99 mg/dL    BUN 12 6 - 20 mg/dL    Creatinine 0.87 0.57 - 1.00 mg/dL    Sodium 139 136 - 145 mmol/L    Potassium 3.8 3.5 - 5.2 mmol/L    Chloride 104 98 - 107 mmol/L    CO2 25.5 22.0 - 29.0 mmol/L    Calcium 9.2 8.6 - 10.5 mg/dL    Total Protein 6.5 6.0 - 8.5 g/dL    Albumin 4.3 3.5 - 5.2 g/dL    ALT (SGPT) 17 1 - 33 U/L    AST (SGOT) 20 1 - 32 U/L    Alkaline Phosphatase 80 39 - 117 U/L    Total Bilirubin 0.5 0.0 - 1.2 mg/dL    Globulin 2.2 gm/dL    A/G Ratio 2.0 g/dL    BUN/Creatinine Ratio 13.8 7.0 - 25.0    Anion Gap 9.5 5.0 - 15.0 mmol/L    eGFR 82.8 >60.0 mL/min/1.73   Lipid Panel   Result Value Ref Range    Total Cholesterol 178 0 - 200 mg/dL    Triglycerides 76 0 - 150 mg/dL    HDL Cholesterol 73 (H) 40 - 60 mg/dL    LDL Cholesterol  91 0 - 100 mg/dL    VLDL Cholesterol 14 5 - 40 mg/dL    LDL/HDL Ratio 1.23    CBC (No Diff)   Result Value Ref Range    WBC 9.51 3.40 - 10.80 10*3/mm3    RBC 4.34 3.77 - 5.28 10*6/mm3    Hemoglobin 13.8 12.0 - 15.9 g/dL    Hematocrit 39.4 34.0 - 46.6 %    MCV 90.8 79.0 - 97.0 fL    MCH 31.8 26.6 - 33.0 pg    MCHC 35.0 31.5 - 35.7 g/dL    RDW 12.7 12.3 - 15.4 %    RDW-SD 42.1 37.0 - 54.0 fl    MPV 11.2 6.0 - 12.0 fL    Platelets 321 140 - 450 10*3/mm3   TSH   Result Value Ref Range    TSH 3.630 0.270 - 4.200 uIU/mL   Hemoglobin A1c   Result Value Ref Range    Hemoglobin A1C 5.00 4.80 - 5.60 %       Blood work completely normal [facility] cholesterol panel is excellent  If you have any questions or concerns, please don't hesitate to call.         Sincerely,        OCTAVIO Driscoll

## 2025-04-03 NOTE — LETTER
Valery Soriano  [address] [facility] [address]    April 3, 2025     Dear Ms. Soriano:    Below are the results from your recent visit:    Resulted Orders   Comprehensive Metabolic Panel   Result Value Ref Range    Glucose 91 65 - 99 mg/dL    BUN 12 6 - 20 mg/dL    Creatinine 0.87 0.57 - 1.00 mg/dL    Sodium 139 136 - 145 mmol/L    Potassium 3.8 3.5 - 5.2 mmol/L    Chloride 104 98 - 107 mmol/L    CO2 25.5 22.0 - 29.0 mmol/L    Calcium 9.2 8.6 - 10.5 mg/dL    Total Protein 6.5 6.0 - 8.5 g/dL    Albumin 4.3 3.5 - 5.2 g/dL    ALT (SGPT) 17 1 - 33 U/L    AST (SGOT) 20 1 - 32 U/L    Alkaline Phosphatase 80 39 - 117 U/L    Total Bilirubin 0.5 0.0 - 1.2 mg/dL    Globulin 2.2 gm/dL    A/G Ratio 2.0 g/dL    BUN/Creatinine Ratio 13.8 7.0 - 25.0    Anion Gap 9.5 5.0 - 15.0 mmol/L    eGFR 82.8 >60.0 mL/min/1.73   Lipid Panel   Result Value Ref Range    Total Cholesterol 178 0 - 200 mg/dL    Triglycerides 76 0 - 150 mg/dL    HDL Cholesterol 73 (H) 40 - 60 mg/dL    LDL Cholesterol  91 0 - 100 mg/dL    VLDL Cholesterol 14 5 - 40 mg/dL    LDL/HDL Ratio 1.23    CBC (No Diff)   Result Value Ref Range    WBC 9.51 3.40 - 10.80 10*3/mm3    RBC 4.34 3.77 - 5.28 10*6/mm3    Hemoglobin 13.8 12.0 - 15.9 g/dL    Hematocrit 39.4 34.0 - 46.6 %    MCV 90.8 79.0 - 97.0 fL    MCH 31.8 26.6 - 33.0 pg    MCHC 35.0 31.5 - 35.7 g/dL    RDW 12.7 12.3 - 15.4 %    RDW-SD 42.1 37.0 - 54.0 fl    MPV 11.2 6.0 - 12.0 fL    Platelets 321 140 - 450 10*3/mm3   TSH   Result Value Ref Range    TSH 3.630 0.270 - 4.200 uIU/mL   Hemoglobin A1c   Result Value Ref Range    Hemoglobin A1C 5.00 4.80 - 5.60 %       Overall your blower looks excellent.    If you have any questions or concerns, please don't hesitate to call.         Sincerely,        OCTAVIO Driscoll

## 2025-08-04 ENCOUNTER — TREATMENT (OUTPATIENT)
Dept: PHYSICAL THERAPY | Facility: CLINIC | Age: 50
End: 2025-08-04
Payer: COMMERCIAL

## 2025-08-04 ENCOUNTER — TRANSCRIBE ORDERS (OUTPATIENT)
Dept: PHYSICAL THERAPY | Facility: CLINIC | Age: 50
End: 2025-08-04
Payer: COMMERCIAL

## 2025-08-04 DIAGNOSIS — M53.86 DECREASED RANGE OF MOTION OF INTERVERTEBRAL DISCS OF LUMBAR SPINE: ICD-10-CM

## 2025-08-04 DIAGNOSIS — M47.816 OSTEOARTHRITIS OF LUMBAR SPINE, UNSPECIFIED SPINAL OSTEOARTHRITIS COMPLICATION STATUS: Primary | ICD-10-CM

## 2025-08-08 ENCOUNTER — TREATMENT (OUTPATIENT)
Dept: PHYSICAL THERAPY | Facility: CLINIC | Age: 50
End: 2025-08-08
Payer: COMMERCIAL

## 2025-08-08 DIAGNOSIS — M47.816 OSTEOARTHRITIS OF LUMBAR SPINE, UNSPECIFIED SPINAL OSTEOARTHRITIS COMPLICATION STATUS: Primary | ICD-10-CM

## 2025-08-08 DIAGNOSIS — M53.86 DECREASED RANGE OF MOTION OF INTERVERTEBRAL DISCS OF LUMBAR SPINE: ICD-10-CM

## 2025-08-11 ENCOUNTER — TREATMENT (OUTPATIENT)
Dept: PHYSICAL THERAPY | Facility: CLINIC | Age: 50
End: 2025-08-11
Payer: COMMERCIAL

## 2025-08-11 DIAGNOSIS — M47.816 OSTEOARTHRITIS OF LUMBAR SPINE, UNSPECIFIED SPINAL OSTEOARTHRITIS COMPLICATION STATUS: Primary | ICD-10-CM

## 2025-08-11 DIAGNOSIS — M53.86 DECREASED RANGE OF MOTION OF INTERVERTEBRAL DISCS OF LUMBAR SPINE: ICD-10-CM

## 2025-08-13 ENCOUNTER — TREATMENT (OUTPATIENT)
Dept: PHYSICAL THERAPY | Facility: CLINIC | Age: 50
End: 2025-08-13
Payer: COMMERCIAL

## 2025-08-13 DIAGNOSIS — M53.86 DECREASED RANGE OF MOTION OF INTERVERTEBRAL DISCS OF LUMBAR SPINE: ICD-10-CM

## 2025-08-13 DIAGNOSIS — M47.816 OSTEOARTHRITIS OF LUMBAR SPINE, UNSPECIFIED SPINAL OSTEOARTHRITIS COMPLICATION STATUS: Primary | ICD-10-CM

## 2025-08-19 ENCOUNTER — TRANSCRIBE ORDERS (OUTPATIENT)
Dept: ADMINISTRATIVE | Facility: HOSPITAL | Age: 50
End: 2025-08-19
Payer: COMMERCIAL

## 2025-08-19 DIAGNOSIS — M54.50 LOW BACK PAIN, UNSPECIFIED BACK PAIN LATERALITY, UNSPECIFIED CHRONICITY, UNSPECIFIED WHETHER SCIATICA PRESENT: Primary | ICD-10-CM

## 2025-08-20 ENCOUNTER — TREATMENT (OUTPATIENT)
Dept: PHYSICAL THERAPY | Facility: CLINIC | Age: 50
End: 2025-08-20
Payer: COMMERCIAL

## 2025-08-20 DIAGNOSIS — M53.86 DECREASED RANGE OF MOTION OF INTERVERTEBRAL DISCS OF LUMBAR SPINE: ICD-10-CM

## 2025-08-20 DIAGNOSIS — M47.816 OSTEOARTHRITIS OF LUMBAR SPINE, UNSPECIFIED SPINAL OSTEOARTHRITIS COMPLICATION STATUS: Primary | ICD-10-CM

## 2025-08-23 DIAGNOSIS — E03.9 HYPOTHYROIDISM, UNSPECIFIED TYPE: ICD-10-CM

## 2025-08-23 DIAGNOSIS — Z00.00 ROUTINE GENERAL MEDICAL EXAMINATION AT A HEALTH CARE FACILITY: ICD-10-CM

## 2025-08-25 RX ORDER — LEVOTHYROXINE SODIUM 25 UG/1
25 TABLET ORAL DAILY
Qty: 90 TABLET | Refills: 3 | Status: SHIPPED | OUTPATIENT
Start: 2025-08-25

## 2025-08-25 RX ORDER — CITALOPRAM HYDROBROMIDE 20 MG/1
20 TABLET ORAL DAILY
Qty: 90 TABLET | Refills: 3 | Status: SHIPPED | OUTPATIENT
Start: 2025-08-25